# Patient Record
Sex: MALE | Race: OTHER | Employment: FULL TIME | URBAN - METROPOLITAN AREA
[De-identification: names, ages, dates, MRNs, and addresses within clinical notes are randomized per-mention and may not be internally consistent; named-entity substitution may affect disease eponyms.]

---

## 2017-01-09 DIAGNOSIS — G04.91 MYELITIS (HCC): Primary | ICD-10-CM

## 2017-01-09 DIAGNOSIS — G35 MULTIPLE SCLEROSIS (HCC): ICD-10-CM

## 2017-01-09 RX ORDER — TRAMADOL HYDROCHLORIDE 50 MG/1
50 TABLET ORAL EVERY 6 HOURS PRN
Qty: 60 TABLET | Refills: 0 | Status: SHIPPED
Start: 2017-01-09 | End: 2017-02-20

## 2017-01-09 NOTE — TELEPHONE ENCOUNTER
Prescription approved for Tramadol  and faxed to [facility] pharmacy with receipt confirmation.

## 2017-01-18 DIAGNOSIS — G35 MULTIPLE SCLEROSIS (HCC): Primary | ICD-10-CM

## 2017-01-18 RX ORDER — GLATIRAMER ACETATE 40 MG/ML
40 INJECTION, SOLUTION SUBCUTANEOUS
Qty: 12 SYRINGE | Refills: 11 | Status: SHIPPED | OUTPATIENT
Start: 2017-01-18 | End: 2018-01-10

## 2017-01-18 NOTE — TELEPHONE ENCOUNTER
Medication: Copaxone    Date of last refill: 1/11/2016  [place] medication    Last office visit: 9/27/2016  Due back to clinic per last office note:  RTC in 6 months  Date next office visit scheduled:  None s

## 2017-02-02 ENCOUNTER — TELEPHONE (OUTPATIENT)
Dept: NEUROLOGY | Facility: CLINIC | Age: 37
End: 2017-02-02

## 2017-02-02 NOTE — TELEPHONE ENCOUNTER
Patient with questions regarding his current medications and receiving a CDL for work. Patient instructed that he would need to verify with licensure/physical regarding Tramadol  He reports he uses it once a day.   Instructed he could not use Ambien before

## 2017-02-20 DIAGNOSIS — G04.91 MYELITIS (HCC): Primary | ICD-10-CM

## 2017-02-20 DIAGNOSIS — G35 MULTIPLE SCLEROSIS (HCC): ICD-10-CM

## 2017-02-20 RX ORDER — TRAMADOL HYDROCHLORIDE 50 MG/1
50 TABLET ORAL EVERY 6 HOURS PRN
Qty: 60 TABLET | Refills: 0 | Status: SHIPPED | OUTPATIENT
Start: 2017-02-20 | End: 2017-04-03

## 2017-02-20 NOTE — TELEPHONE ENCOUNTER
Medication: tramadol    Date of last refill: 1/9/17  [place] 5/5/64    Last office visit: 9/27/16  Due back to clinic per last office note:  6 months  Date next office visit scheduled:  No future appointments.     Last OV

## 2017-03-07 ENCOUNTER — OFFICE VISIT (OUTPATIENT)
Dept: FAMILY MEDICINE CLINIC | Facility: CLINIC | Age: 37
End: 2017-03-07

## 2017-03-07 VITALS
WEIGHT: 184 LBS | SYSTOLIC BLOOD PRESSURE: 108 MMHG | TEMPERATURE: 99 F | BODY MASS INDEX: 25.76 KG/M2 | HEART RATE: 78 BPM | RESPIRATION RATE: 20 BRPM | HEIGHT: 71 IN | OXYGEN SATURATION: 98 % | DIASTOLIC BLOOD PRESSURE: 68 MMHG

## 2017-03-07 DIAGNOSIS — G35 MULTIPLE SCLEROSIS (HCC): Primary | ICD-10-CM

## 2017-03-07 PROCEDURE — 99203 OFFICE O/P NEW LOW 30 MIN: CPT | Performed by: FAMILY MEDICINE

## 2017-03-08 ENCOUNTER — NURSE ONLY (OUTPATIENT)
Dept: NEUROLOGY | Facility: CLINIC | Age: 37
End: 2017-03-08

## 2017-03-08 VITALS — SYSTOLIC BLOOD PRESSURE: 110 MMHG | HEART RATE: 64 BPM | DIASTOLIC BLOOD PRESSURE: 64 MMHG | RESPIRATION RATE: 16 BRPM

## 2017-03-08 DIAGNOSIS — G35 MULTIPLE SCLEROSIS (HCC): Primary | ICD-10-CM

## 2017-03-08 PROCEDURE — 96365 THER/PROPH/DIAG IV INF INIT: CPT | Performed by: OTHER

## 2017-03-08 RX ORDER — METHYLPREDNISOLONE SODIUM SUCCINATE 500 MG/1
500 INJECTION, POWDER, FOR SOLUTION INTRAMUSCULAR; INTRAVENOUS ONCE
Status: COMPLETED | OUTPATIENT
Start: 2017-03-08 | End: 2017-03-08

## 2017-03-08 RX ADMIN — METHYLPREDNISOLONE SODIUM SUCCINATE 500 MG: 500 INJECTION, POWDER, FOR SOLUTION INTRAMUSCULAR; INTRAVENOUS at 10:37:00

## 2017-03-08 NOTE — PROGRESS NOTES
IV started per Yamila Flores RN into the R AC and Solu-Medrol infused over 20 minutes without difficulty. IV discontinued per RN. Patient tolerated infusion well with no complications.

## 2017-03-13 ENCOUNTER — TELEPHONE (OUTPATIENT)
Dept: NEUROLOGY | Facility: CLINIC | Age: 37
End: 2017-03-13

## 2017-03-13 ENCOUNTER — NURSE ONLY (OUTPATIENT)
Dept: NEUROLOGY | Facility: CLINIC | Age: 37
End: 2017-03-13

## 2017-03-13 VITALS — RESPIRATION RATE: 18 BRPM | DIASTOLIC BLOOD PRESSURE: 78 MMHG | HEART RATE: 69 BPM | SYSTOLIC BLOOD PRESSURE: 126 MMHG

## 2017-03-13 DIAGNOSIS — F19.982 INSOMNIA DUE TO DRUG (HCC): ICD-10-CM

## 2017-03-13 DIAGNOSIS — G35 MULTIPLE SCLEROSIS (HCC): Primary | ICD-10-CM

## 2017-03-13 PROCEDURE — 96365 THER/PROPH/DIAG IV INF INIT: CPT | Performed by: OTHER

## 2017-03-13 RX ORDER — ZOLPIDEM TARTRATE 10 MG/1
10 TABLET ORAL NIGHTLY PRN
Qty: 10 TABLET | Refills: 0 | Status: SHIPPED
Start: 2017-03-13 | End: 2017-05-15

## 2017-03-13 RX ORDER — METHYLPREDNISOLONE SODIUM SUCCINATE 500 MG/1
500 INJECTION, POWDER, FOR SOLUTION INTRAMUSCULAR; INTRAVENOUS ONCE
Status: COMPLETED | OUTPATIENT
Start: 2017-03-13 | End: 2017-03-13

## 2017-03-13 RX ADMIN — METHYLPREDNISOLONE SODIUM SUCCINATE 500 MG: 500 INJECTION, POWDER, FOR SOLUTION INTRAMUSCULAR; INTRAVENOUS at 14:52:00

## 2017-03-13 NOTE — PROGRESS NOTES
IV started per Alcides Castle into Right AC and Solu-Medrol infused over 20 minutes without difficulty. IV discontinued per RN. Patient tolerated infusion well with no complications.

## 2017-03-13 NOTE — TELEPHONE ENCOUNTER
Discussed with franky De La Garza to give additional 500 mg solumedrol today. Patient contacted for appointment.

## 2017-03-13 NOTE — TELEPHONE ENCOUNTER
At his solumedrol appointment today, Makenzie Leo requests a refill of his Ambien.     Medication: Ambien    Date of last refill: 9/27/16  [place] 1/01/03    Last office visit: 9/27/16 (MD appointment); 3/13/17 (Solumedrol)  Due

## 2017-03-31 ENCOUNTER — TELEPHONE (OUTPATIENT)
Dept: NEUROLOGY | Facility: CLINIC | Age: 37
End: 2017-03-31

## 2017-03-31 DIAGNOSIS — G04.91 MYELITIS (HCC): Primary | ICD-10-CM

## 2017-03-31 DIAGNOSIS — G35 MULTIPLE SCLEROSIS (HCC): ICD-10-CM

## 2017-04-03 RX ORDER — TRAMADOL HYDROCHLORIDE 50 MG/1
50 TABLET ORAL EVERY 6 HOURS PRN
Qty: 60 TABLET | Refills: 0 | Status: SHIPPED
Start: 2017-04-03 | End: 2017-05-15

## 2017-04-03 NOTE — TELEPHONE ENCOUNTER
Medication: Tramadol    Date of last refill: 2-20-17  [place] 6-89-05    Last office visit: 9-27-16  Due back to clinic per last office note:  6 mo  Date next office visit scheduled:  4-13-17    Last OV note recommendati

## 2017-04-06 RX ORDER — TRAMADOL HYDROCHLORIDE 50 MG/1
TABLET ORAL
Qty: 60 TABLET | Refills: 0 | OUTPATIENT
Start: 2017-04-06

## 2017-04-06 NOTE — PROGRESS NOTES
HPI:    Patient ID: Amina Luna is a 29year old male. HPI Comments: Pt with chronic MS.   Gets blurry vision, numbness, and weakness and dizzyness from this in the past.  Changed insurance and needs a referral to see neurologist.  Has been off of neuro as soon as possible. 1. Multiple sclerosis ([facility] [address]): supra and infratentorial  - Neuro Referral - In Network     No orders of the defined types were placed in this encounter.        Meds This Visit:  No prescriptions requested or ordered in this encount

## 2017-04-13 ENCOUNTER — OFFICE VISIT (OUTPATIENT)
Dept: NEUROLOGY | Facility: CLINIC | Age: 37
End: 2017-04-13

## 2017-04-13 VITALS
SYSTOLIC BLOOD PRESSURE: 115 MMHG | RESPIRATION RATE: 16 BRPM | WEIGHT: 184 LBS | DIASTOLIC BLOOD PRESSURE: 66 MMHG | HEIGHT: 71 IN | HEART RATE: 70 BPM | BODY MASS INDEX: 25.76 KG/M2

## 2017-04-13 DIAGNOSIS — G35 MULTIPLE SCLEROSIS (HCC): Primary | ICD-10-CM

## 2017-04-13 DIAGNOSIS — G04.91 MYELITIS (HCC): ICD-10-CM

## 2017-04-13 PROCEDURE — 99214 OFFICE O/P EST MOD 30 MIN: CPT | Performed by: OTHER

## 2017-04-13 PROCEDURE — 96365 THER/PROPH/DIAG IV INF INIT: CPT | Performed by: OTHER

## 2017-04-13 RX ORDER — METHYLPREDNISOLONE SODIUM SUCCINATE 500 MG/1
500 INJECTION, POWDER, FOR SOLUTION INTRAMUSCULAR; INTRAVENOUS DAILY
Status: COMPLETED | OUTPATIENT
Start: 2017-04-13 | End: 2017-04-14

## 2017-04-13 RX ADMIN — METHYLPREDNISOLONE SODIUM SUCCINATE 500 MG: 500 INJECTION, POWDER, FOR SOLUTION INTRAMUSCULAR; INTRAVENOUS at 11:55:00

## 2017-04-13 NOTE — PATIENT INSTRUCTIONS
Refill policies:    • Allow 2 business days for refills; controlled substances may take longer.   • Contact your pharmacy at least 5 days prior to running out of medication and have them send an electronic request or submit request through the “request re insurance carrier to obtain pre-certification or prior authorization. Unfortunately, [facility] has seen an increase in denial of payment even though the procedure/test has been pre-certified.   You are strongly encouraged to contact your insurance carrier to v

## 2017-04-13 NOTE — PROGRESS NOTES
[facility] with [facility] [place]  11/3/1980  Primary Care Provider:  Rommel Malik DO    4/13/2017    29year old yo patient being seen for:  MS    Currently on Copaxone and pulsed s infratentorial  (primary encounter diagnosis)  Myelitis:  entire cord, nodular pattern    Discussion on the case:  He is actually stable with this regimen and it is the \"Safest\" to use  Other alternative are all risky  No signs of it breaking through by

## 2017-04-13 NOTE — PROGRESS NOTES
IV started per Dallas Botello RN into Right AC and Solu-Medrol infused over 20 minutes without difficulty. IV discontinued per RN. Patient tolerated infusion well with no complications.

## 2017-04-14 ENCOUNTER — NURSE ONLY (OUTPATIENT)
Dept: NEUROLOGY | Facility: CLINIC | Age: 37
End: 2017-04-14

## 2017-04-14 DIAGNOSIS — G35 MULTIPLE SCLEROSIS (HCC): Primary | ICD-10-CM

## 2017-04-14 PROCEDURE — 96365 THER/PROPH/DIAG IV INF INIT: CPT | Performed by: OTHER

## 2017-04-14 RX ORDER — METHYLPREDNISOLONE 4 MG/1
TABLET ORAL
Qty: 1 KIT | Refills: 0 | Status: SHIPPED | OUTPATIENT
Start: 2017-04-14 | End: 2017-04-22

## 2017-04-14 RX ADMIN — METHYLPREDNISOLONE SODIUM SUCCINATE 500 MG: 500 INJECTION, POWDER, FOR SOLUTION INTRAMUSCULAR; INTRAVENOUS at 09:34:00

## 2017-04-14 NOTE — PROGRESS NOTES
IV started per Candis Kelly RN into R AC and Solu-Medrol infused over 20 minutes without difficulty. IV discontinued per RN. Patient tolerated infusion well with no complications.

## 2017-04-22 ENCOUNTER — OFFICE VISIT (OUTPATIENT)
Dept: FAMILY MEDICINE CLINIC | Facility: CLINIC | Age: 37
End: 2017-04-22

## 2017-04-22 ENCOUNTER — HOSPITAL ENCOUNTER (OUTPATIENT)
Dept: GENERAL RADIOLOGY | Age: 37
Discharge: HOME | End: 2017-04-22
Attending: FAMILY MEDICINE
Payer: COMMERCIAL

## 2017-04-22 VITALS
WEIGHT: 180 LBS | RESPIRATION RATE: 18 BRPM | DIASTOLIC BLOOD PRESSURE: 86 MMHG | SYSTOLIC BLOOD PRESSURE: 126 MMHG | HEIGHT: 71 IN | BODY MASS INDEX: 25.2 KG/M2 | HEART RATE: 65 BPM

## 2017-04-22 DIAGNOSIS — S89.92XA LEFT LEG INJURY, INITIAL ENCOUNTER: ICD-10-CM
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
## 2017-04-22 DIAGNOSIS — S89.92XA LEFT LEG INJURY, INITIAL ENCOUNTER: Primary | ICD-10-CM

## 2017-04-22 DIAGNOSIS — S09.90XA HEAD INJURIES, INITIAL ENCOUNTER: ICD-10-CM

## 2017-04-22 PROCEDURE — 99214 OFFICE O/P EST MOD 30 MIN: CPT | Performed by: FAMILY MEDICINE

## 2017-04-22 PROCEDURE — 73560 X-RAY EXAM OF KNEE 1 OR 2: CPT

## 2017-04-22 PROCEDURE — 73590 X-RAY EXAM OF LOWER LEG: CPT

## 2017-04-22 RX ORDER — HYDROCODONE BITARTRATE AND ACETAMINOPHEN 10; 325 MG/1; MG/1
1 TABLET ORAL EVERY 6 HOURS PRN
Qty: 20 TABLET | Refills: 0 | Status: SHIPPED | OUTPATIENT
Start: 2017-04-22 | End: 2017-05-12
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
## 2017-05-15 ENCOUNTER — NURSE ONLY (OUTPATIENT)
Dept: NEUROLOGY | Facility: CLINIC | Age: 37
End: 2017-05-15

## 2017-05-15 VITALS — SYSTOLIC BLOOD PRESSURE: 114 MMHG | HEART RATE: 74 BPM | DIASTOLIC BLOOD PRESSURE: 64 MMHG | RESPIRATION RATE: 18 BRPM

## 2017-05-15 DIAGNOSIS — G35 MULTIPLE SCLEROSIS (HCC): Primary | ICD-10-CM

## 2017-05-15 DIAGNOSIS — G35 MULTIPLE SCLEROSIS (HCC): ICD-10-CM

## 2017-05-15 DIAGNOSIS — F19.982 INSOMNIA DUE TO DRUG (HCC): ICD-10-CM

## 2017-05-15 DIAGNOSIS — G04.91 MYELITIS (HCC): Primary | ICD-10-CM

## 2017-05-15 PROCEDURE — 96365 THER/PROPH/DIAG IV INF INIT: CPT | Performed by: OTHER

## 2017-05-15 RX ORDER — TRAMADOL HYDROCHLORIDE 50 MG/1
50 TABLET ORAL EVERY 6 HOURS PRN
Qty: 60 TABLET | Refills: 0 | Status: SHIPPED | OUTPATIENT
Start: 2017-05-15 | End: 2017-06-13

## 2017-05-15 RX ORDER — TRAMADOL HYDROCHLORIDE 50 MG/1
50 TABLET ORAL EVERY 6 HOURS PRN
Qty: 60 TABLET | Refills: 0 | Status: CANCELLED | OUTPATIENT
Start: 2017-05-15

## 2017-05-15 RX ORDER — ZOLPIDEM TARTRATE 10 MG/1
10 TABLET ORAL NIGHTLY PRN
Qty: 10 TABLET | Refills: 0 | Status: SHIPPED | OUTPATIENT
Start: 2017-05-15 | End: 2017-08-16

## 2017-05-15 RX ORDER — METHYLPREDNISOLONE SODIUM SUCCINATE 500 MG/1
500 INJECTION, POWDER, FOR SOLUTION INTRAMUSCULAR; INTRAVENOUS ONCE
Status: COMPLETED | OUTPATIENT
Start: 2017-05-15 | End: 2017-05-15

## 2017-05-15 RX ORDER — ZOLPIDEM TARTRATE 10 MG/1
10 TABLET ORAL NIGHTLY PRN
Qty: 10 TABLET | Refills: 0 | Status: CANCELLED | OUTPATIENT
Start: 2017-05-15

## 2017-05-15 RX ADMIN — METHYLPREDNISOLONE SODIUM SUCCINATE 500 MG: 500 INJECTION, POWDER, FOR SOLUTION INTRAMUSCULAR; INTRAVENOUS at 12:05:00

## 2017-05-15 NOTE — PROGRESS NOTES
IV started per Alcides Castle RN into Right AC and Solu-Medrol infused over 20 minutes without difficulty. IV discontinued per RN. Patient tolerated infusion well with no complications.

## 2017-05-15 NOTE — TELEPHONE ENCOUNTER
Medication: Tramadol    Date of last refill: 4/3/2016  [place] Filled for 60 tabs/no refills    Last office visit: 4/13/2017  Due back to clinic per last office note:  RTC in 6-8 months  Date next office visit scheduled:

## 2017-05-23 ENCOUNTER — OFFICE VISIT (OUTPATIENT)
Dept: FAMILY MEDICINE CLINIC | Facility: CLINIC | Age: 37
End: 2017-05-23

## 2017-05-23 VITALS
HEART RATE: 72 BPM | TEMPERATURE: 98 F | HEIGHT: 71 IN | WEIGHT: 174 LBS | BODY MASS INDEX: 24.36 KG/M2 | SYSTOLIC BLOOD PRESSURE: 100 MMHG | RESPIRATION RATE: 18 BRPM | DIASTOLIC BLOOD PRESSURE: 70 MMHG

## 2017-05-23 DIAGNOSIS — G35 MULTIPLE SCLEROSIS (HCC): Primary | ICD-10-CM

## 2017-05-23 DIAGNOSIS — Z00.00 ANNUAL PHYSICAL EXAM: ICD-10-CM

## 2017-05-23 PROCEDURE — G0438 PPPS, INITIAL VISIT: HCPCS | Performed by: FAMILY MEDICINE

## 2017-05-23 PROCEDURE — 99395 PREV VISIT EST AGE 18-39: CPT | Performed by: FAMILY MEDICINE

## 2017-05-23 RX ORDER — BACLOFEN 10 MG/1
TABLET ORAL
Qty: 60 TABLET | Refills: 1 | Status: SHIPPED | OUTPATIENT
Start: 2017-05-23 | End: 2018-04-27

## 2017-05-24 ENCOUNTER — LAB ENCOUNTER (OUTPATIENT)
Dept: LAB | Age: 37
End: 2017-05-24
Attending: FAMILY MEDICINE
Payer: COMMERCIAL

## 2017-05-24 ENCOUNTER — TELEPHONE (OUTPATIENT)
Dept: FAMILY MEDICINE CLINIC | Facility: CLINIC | Age: 37
End: 2017-05-24

## 2017-05-24 DIAGNOSIS — Z00.00 ANNUAL PHYSICAL EXAM: ICD-10-CM

## 2017-05-24 PROCEDURE — 80053 COMPREHEN METABOLIC PANEL: CPT

## 2017-05-24 PROCEDURE — 36415 COLL VENOUS BLD VENIPUNCTURE: CPT

## 2017-05-24 PROCEDURE — 80061 LIPID PANEL: CPT

## 2017-05-24 PROCEDURE — 84443 ASSAY THYROID STIM HORMONE: CPT

## 2017-05-24 PROCEDURE — 82306 VITAMIN D 25 HYDROXY: CPT

## 2017-05-24 PROCEDURE — 85025 COMPLETE CBC W/AUTO DIFF WBC: CPT

## 2017-05-24 PROCEDURE — 82607 VITAMIN B-12: CPT

## 2017-05-24 NOTE — H&P
Shruthi Santos is a 29year old male who presents for a complete physical exam.   HPI:   Pt complains of chronic MS. Having leg pain. Needing tramadol and baclofen for this.            Current Outpatient Prescriptions:  baclofen 10 MG Oral Tab Take 1/2 or asthma    EXAM:   /70 mmHg  Pulse 72  Temp(Src) 98.1 °F (36.7 °C) (Oral)  Resp 18  Ht 71\"  Wt 174 lb  BMI 24.28 kg/m2  Body mass index is 24.28 kg/(m^2).      GENERAL: well developed, well nourished,in no apparent distress  SKIN: no rashes,no susp [E]  Standing Status: Future  Standing Expiration Date: 5/23/2018  Vitamin B12 [E]  Standing Status: Future  Standing Expiration Date: 5/23/2018

## 2017-06-06 NOTE — TELEPHONE ENCOUNTER
Received fax from Karrie Wheatley stating NO Records found. Called and informed patient.  Sent Reply to Scan

## 2017-06-13 DIAGNOSIS — G04.91 MYELITIS (HCC): Primary | ICD-10-CM

## 2017-06-13 DIAGNOSIS — G35 MULTIPLE SCLEROSIS (HCC): ICD-10-CM

## 2017-06-13 RX ORDER — TRAMADOL HYDROCHLORIDE 50 MG/1
50 TABLET ORAL EVERY 6 HOURS PRN
Qty: 60 TABLET | Refills: 0 | Status: SHIPPED
Start: 2017-06-13 | End: 2017-07-17

## 2017-06-13 NOTE — TELEPHONE ENCOUNTER
Medication: Tramadol    Date of last refill: 5/15/17  [place] 0/16/50    Last office visit: 4/13/17  Due back to clinic per last office note:  6 months  Date next office visit scheduled:  No future appointments.     Last

## 2017-06-27 ENCOUNTER — NURSE ONLY (OUTPATIENT)
Dept: NEUROLOGY | Facility: CLINIC | Age: 37
End: 2017-06-27

## 2017-06-27 VITALS — RESPIRATION RATE: 18 BRPM | HEART RATE: 76 BPM | DIASTOLIC BLOOD PRESSURE: 64 MMHG | SYSTOLIC BLOOD PRESSURE: 110 MMHG

## 2017-06-27 DIAGNOSIS — G35 MULTIPLE SCLEROSIS (HCC): Primary | ICD-10-CM

## 2017-06-27 PROCEDURE — 96365 THER/PROPH/DIAG IV INF INIT: CPT | Performed by: OTHER

## 2017-06-27 RX ORDER — METHYLPREDNISOLONE SODIUM SUCCINATE 500 MG/1
500 INJECTION, POWDER, FOR SOLUTION INTRAMUSCULAR; INTRAVENOUS ONCE
Status: COMPLETED | OUTPATIENT
Start: 2017-06-27 | End: 2017-06-27

## 2017-06-27 RX ADMIN — METHYLPREDNISOLONE SODIUM SUCCINATE 500 MG: 500 INJECTION, POWDER, FOR SOLUTION INTRAMUSCULAR; INTRAVENOUS at 13:13:00

## 2017-06-27 NOTE — PROGRESS NOTES
IV started per Mily Mendiola RN into right [facility] and Solu-Medrol infused over 20 minutes without difficulty. IV discontinued per RN. Patient tolerated infusion well with no complications.

## 2017-07-17 DIAGNOSIS — G04.91 MYELITIS (HCC): ICD-10-CM

## 2017-07-17 DIAGNOSIS — G35 MULTIPLE SCLEROSIS (HCC): ICD-10-CM

## 2017-07-17 RX ORDER — TRAMADOL HYDROCHLORIDE 50 MG/1
50 TABLET ORAL EVERY 6 HOURS PRN
Qty: 60 TABLET | Refills: 0 | Status: SHIPPED
Start: 2017-07-17 | End: 2017-08-16

## 2017-07-17 NOTE — TELEPHONE ENCOUNTER
Medication: tramadol    Date of last refill: 6/13/17  [place] 4/96/43    Last office visit: 4/13/17  Due back to clinic per last office note:  6 months  Date next office visit scheduled:  No future appointments.     Last

## 2017-08-09 ENCOUNTER — TELEPHONE (OUTPATIENT)
Dept: FAMILY MEDICINE CLINIC | Facility: CLINIC | Age: 37
End: 2017-08-09

## 2017-08-09 NOTE — TELEPHONE ENCOUNTER
PT NEEDS A NOTE STATING HE IS NOT TAKING TRAMADOL ANY MORE.     THIS IS FOR HIS DOT PX.     FILL OUT THIS FORM HE HAS LEFT.      CALL HIM WHEN READY AND FAX TO THE NUMBER HE HAS LEFT WITH THE FORM      Please see attached message    Patient was last seen by

## 2017-08-09 NOTE — TELEPHONE ENCOUNTER
PT NEEDS A NOTE STATING HE IS NOT TAKING TRAMADOL ANY MORE. THIS IS FOR HIS DOT PX. FILL OUT THIS FORM HE HAS LEFT. CALL HIM WHEN READY AND FAX TO THE NUMBER HE HAS LEFT WITH THE FORM.

## 2017-08-15 ENCOUNTER — TELEPHONE (OUTPATIENT)
Dept: NEUROLOGY | Facility: CLINIC | Age: 37
End: 2017-08-15

## 2017-08-15 ENCOUNTER — NURSE ONLY (OUTPATIENT)
Dept: NEUROLOGY | Facility: CLINIC | Age: 37
End: 2017-08-15

## 2017-08-15 VITALS — HEART RATE: 78 BPM | RESPIRATION RATE: 18 BRPM | SYSTOLIC BLOOD PRESSURE: 128 MMHG | DIASTOLIC BLOOD PRESSURE: 66 MMHG

## 2017-08-15 DIAGNOSIS — G35 MULTIPLE SCLEROSIS (HCC): Primary | ICD-10-CM

## 2017-08-15 PROCEDURE — 96365 THER/PROPH/DIAG IV INF INIT: CPT | Performed by: OTHER

## 2017-08-15 RX ORDER — METHYLPREDNISOLONE SODIUM SUCCINATE 500 MG/1
500 INJECTION, POWDER, FOR SOLUTION INTRAMUSCULAR; INTRAVENOUS ONCE
Status: COMPLETED | OUTPATIENT
Start: 2017-08-15 | End: 2017-08-15

## 2017-08-15 RX ADMIN — METHYLPREDNISOLONE SODIUM SUCCINATE 500 MG: 500 INJECTION, POWDER, FOR SOLUTION INTRAMUSCULAR; INTRAVENOUS at 14:13:00

## 2017-08-15 NOTE — TELEPHONE ENCOUNTER
Patient informed he will need to get note from Dr. Adrienne Machado. Clearance form left upfront for patient to .

## 2017-08-16 DIAGNOSIS — G35 MULTIPLE SCLEROSIS (HCC): ICD-10-CM

## 2017-08-16 DIAGNOSIS — F19.982 INSOMNIA DUE TO DRUG (HCC): ICD-10-CM

## 2017-08-16 DIAGNOSIS — G04.91 MYELITIS (HCC): ICD-10-CM

## 2017-08-16 RX ORDER — ZOLPIDEM TARTRATE 10 MG/1
10 TABLET ORAL NIGHTLY PRN
Qty: 10 TABLET | Refills: 0 | Status: SHIPPED
Start: 2017-08-16 | End: 2017-10-23

## 2017-08-16 RX ORDER — TRAMADOL HYDROCHLORIDE 50 MG/1
50 TABLET ORAL EVERY 6 HOURS PRN
Qty: 60 TABLET | Refills: 0 | Status: SHIPPED
Start: 2017-08-16 | End: 2017-09-19

## 2017-08-16 NOTE — TELEPHONE ENCOUNTER
Medication: tramadol, ambien    Date of last refill: 7/17/17 (tramadol), 5/15/17 Elizabeth Romberg)  [place] 6/71/29 (tramadol), 5/15/17 Elizabeth Romberg)    Last office visit: 4/13/17  Due back to clinic per last office note:  6 months  D

## 2017-09-19 ENCOUNTER — NURSE ONLY (OUTPATIENT)
Dept: NEUROLOGY | Facility: CLINIC | Age: 37
End: 2017-09-19

## 2017-09-19 VITALS
HEIGHT: 71 IN | RESPIRATION RATE: 16 BRPM | DIASTOLIC BLOOD PRESSURE: 77 MMHG | SYSTOLIC BLOOD PRESSURE: 136 MMHG | WEIGHT: 182 LBS | BODY MASS INDEX: 25.48 KG/M2 | HEART RATE: 77 BPM

## 2017-09-19 DIAGNOSIS — G35 MULTIPLE SCLEROSIS (HCC): ICD-10-CM

## 2017-09-19 DIAGNOSIS — G04.91 MYELITIS (HCC): ICD-10-CM

## 2017-09-19 DIAGNOSIS — G35 MULTIPLE SCLEROSIS (HCC): Primary | ICD-10-CM

## 2017-09-19 PROCEDURE — 96365 THER/PROPH/DIAG IV INF INIT: CPT | Performed by: OTHER

## 2017-09-19 RX ORDER — TRAMADOL HYDROCHLORIDE 50 MG/1
50 TABLET ORAL EVERY 6 HOURS PRN
Qty: 60 TABLET | Refills: 0 | Status: SHIPPED
Start: 2017-09-19 | End: 2017-10-19

## 2017-09-19 RX ORDER — METHYLPREDNISOLONE SODIUM SUCCINATE 500 MG/1
500 INJECTION, POWDER, FOR SOLUTION INTRAMUSCULAR; INTRAVENOUS ONCE
Status: COMPLETED | OUTPATIENT
Start: 2017-09-19 | End: 2017-09-19

## 2017-09-19 RX ADMIN — METHYLPREDNISOLONE SODIUM SUCCINATE 500 MG: 500 INJECTION, POWDER, FOR SOLUTION INTRAMUSCULAR; INTRAVENOUS at 13:35:00

## 2017-09-19 NOTE — TELEPHONE ENCOUNTER
Medication: Tramadol    Date of last refill: 8/16/2017  [place] for this medication    Last office visit: 4/13/2017  Due back to clinic per last office note:  RTC in 6 months  Date next office visit scheduled:  Future

## 2017-09-19 NOTE — PROGRESS NOTES
IV started per Diane Bose RN into right antecubital site and Solu-Medrol infused over 20 minutes without difficulty. IV discontinued per RN. Patient tolerated infusion well with no complications. Patient requesting Tramadol refill during visit.   Will proce

## 2017-09-19 NOTE — PATIENT INSTRUCTIONS
Refill policies:    • Allow 2-3 business days for refills; controlled substances may take longer.   • Contact your pharmacy at least 5 days prior to running out of medication and have them send an electronic request or submit request through the [facility] have a procedure or additional testing performed. [facility]) will contact your insurance carrier to obtain pre-certification or prior authorization.     Unfortunately, [facility] has seen an increase in denial of payment even though the p

## 2017-10-02 ENCOUNTER — TELEPHONE (OUTPATIENT)
Dept: NEUROLOGY | Facility: CLINIC | Age: 37
End: 2017-10-02

## 2017-10-19 DIAGNOSIS — G35 MULTIPLE SCLEROSIS (HCC): ICD-10-CM

## 2017-10-19 DIAGNOSIS — G04.91 MYELITIS (HCC): ICD-10-CM

## 2017-10-19 RX ORDER — TRAMADOL HYDROCHLORIDE 50 MG/1
50 TABLET ORAL EVERY 6 HOURS PRN
Qty: 60 TABLET | Refills: 0 | Status: SHIPPED
Start: 2017-10-19 | End: 2017-11-16

## 2017-10-19 NOTE — TELEPHONE ENCOUNTER
Prescription approved for Tramadol and faxed to [facility] pharmacy with receipt confirmation.

## 2017-10-19 NOTE — TELEPHONE ENCOUNTER
Medication: Tramadol 50 mg     Date of last refill: 09/19/17  [place] for this medication     Last office visit: 4/13/2017  Due back to clinic per last office note:  RTC in 6 months  Date next office visit schedule

## 2017-10-23 ENCOUNTER — OFFICE VISIT (OUTPATIENT)
Dept: NEUROLOGY | Facility: CLINIC | Age: 37
End: 2017-10-23

## 2017-10-23 VITALS
HEIGHT: 71 IN | WEIGHT: 186 LBS | SYSTOLIC BLOOD PRESSURE: 128 MMHG | BODY MASS INDEX: 26.04 KG/M2 | DIASTOLIC BLOOD PRESSURE: 63 MMHG | RESPIRATION RATE: 16 BRPM | HEART RATE: 78 BPM

## 2017-10-23 DIAGNOSIS — F19.982 INSOMNIA DUE TO DRUG (HCC): ICD-10-CM

## 2017-10-23 DIAGNOSIS — G35 MULTIPLE SCLEROSIS (HCC): Primary | ICD-10-CM

## 2017-10-23 DIAGNOSIS — R53.83 OTHER FATIGUE: ICD-10-CM

## 2017-10-23 PROCEDURE — 96365 THER/PROPH/DIAG IV INF INIT: CPT | Performed by: PHYSICIAN ASSISTANT

## 2017-10-23 PROCEDURE — 99214 OFFICE O/P EST MOD 30 MIN: CPT | Performed by: PHYSICIAN ASSISTANT

## 2017-10-23 RX ORDER — METHYLPREDNISOLONE SODIUM SUCCINATE 500 MG/1
500 INJECTION, POWDER, FOR SOLUTION INTRAMUSCULAR; INTRAVENOUS ONCE
Status: COMPLETED | OUTPATIENT
Start: 2017-10-23 | End: 2017-10-23

## 2017-10-23 RX ORDER — ZOLPIDEM TARTRATE 10 MG/1
10 TABLET ORAL NIGHTLY PRN
Qty: 10 TABLET | Refills: 0 | Status: SHIPPED | OUTPATIENT
Start: 2017-10-23 | End: 2017-12-04

## 2017-10-23 RX ORDER — MODAFINIL 200 MG/1
TABLET ORAL
Qty: 30 TABLET | Refills: 2 | Status: SHIPPED | OUTPATIENT
Start: 2017-10-23 | End: 2018-07-16

## 2017-10-23 RX ADMIN — METHYLPREDNISOLONE SODIUM SUCCINATE 500 MG: 500 INJECTION, POWDER, FOR SOLUTION INTRAMUSCULAR; INTRAVENOUS at 14:45:00

## 2017-10-23 NOTE — PROGRESS NOTES
IV started per Carie Harrison RN, into right antecubital site. Solu-Medrol infused over 20 minutes without difficulty. IV discontinued per RN. Patient tolerated infusion well with no complications.

## 2017-10-23 NOTE — PATIENT INSTRUCTIONS
Refill policies:    • Allow 2-3 business days for refills; controlled substances may take longer.   • Contact your pharmacy at least 5 days prior to running out of medication and have them send an electronic request or submit request through the [facility] have a procedure or additional testing performed. [facility]) will contact your insurance carrier to obtain pre-certification or prior authorization.     Unfortunately, [facility] has seen an increase in denial of payment even though the p

## 2017-10-23 NOTE — PROGRESS NOTES
HPI:    Patient ID: Nickolas Calle is a 29year old male. HPI     Patient is a 29year old male here for follow-up of MS. He feels stable. He is currently on the copaxone and tolerating it without side effects. No weakness. No paresthesias.  Balance i Normocephalic and atraumatic. Eyes: Conjunctivae and EOM are normal.   Cardiovascular: Normal rate, regular rhythm and normal heart sounds. No murmur heard. Pulmonary/Chest: Effort normal and breath sounds normal. No respiratory distress.  He has no w (CPT=72156)       [id number]

## 2017-11-16 DIAGNOSIS — G35 MULTIPLE SCLEROSIS (HCC): ICD-10-CM

## 2017-11-16 DIAGNOSIS — G04.91 MYELITIS (HCC): ICD-10-CM

## 2017-11-16 RX ORDER — TRAMADOL HYDROCHLORIDE 50 MG/1
50 TABLET ORAL EVERY 6 HOURS PRN
Qty: 60 TABLET | Refills: 0 | Status: SHIPPED
Start: 2017-11-16 | End: 2017-11-22

## 2017-11-16 NOTE — TELEPHONE ENCOUNTER
Prescription approved for Tramadol and faxed to [facility] pharmacy with receipt confirmation.

## 2017-11-16 NOTE — TELEPHONE ENCOUNTER
Clarified that Ej's modafinil will counteract sleepiness.     Medication: tramadol    Date of last refill: 10/19/17  [place] 03/71/77    Last office visit: 10/23/17  Due back to clinic per last office note:  4 months  D

## 2017-11-22 RX ORDER — TRAMADOL HYDROCHLORIDE 50 MG/1
50 TABLET ORAL EVERY 6 HOURS PRN
Qty: 60 TABLET | Refills: 0 | OUTPATIENT
Start: 2017-11-22 | End: 2018-01-10

## 2017-11-22 NOTE — TELEPHONE ENCOUNTER
Mail order Rx cancelled. Called to [employer] on file. Rx to [employer] for [employer].

## 2017-12-04 ENCOUNTER — NURSE ONLY (OUTPATIENT)
Dept: NEUROLOGY | Facility: CLINIC | Age: 37
End: 2017-12-04

## 2017-12-04 VITALS — RESPIRATION RATE: 18 BRPM | SYSTOLIC BLOOD PRESSURE: 120 MMHG | DIASTOLIC BLOOD PRESSURE: 74 MMHG | HEART RATE: 72 BPM

## 2017-12-04 DIAGNOSIS — F19.982 INSOMNIA DUE TO DRUG (HCC): ICD-10-CM

## 2017-12-04 DIAGNOSIS — G35 MULTIPLE SCLEROSIS (HCC): Primary | ICD-10-CM

## 2017-12-04 PROCEDURE — 96365 THER/PROPH/DIAG IV INF INIT: CPT | Performed by: OTHER

## 2017-12-04 RX ORDER — ZOLPIDEM TARTRATE 10 MG/1
10 TABLET ORAL NIGHTLY PRN
Qty: 10 TABLET | Refills: 0 | Status: SHIPPED
Start: 2017-12-04 | End: 2018-05-23

## 2017-12-04 RX ORDER — METHYLPREDNISOLONE SODIUM SUCCINATE 500 MG/1
500 INJECTION, POWDER, FOR SOLUTION INTRAMUSCULAR; INTRAVENOUS ONCE
Status: COMPLETED | OUTPATIENT
Start: 2017-12-04 | End: 2017-12-04

## 2017-12-04 RX ADMIN — METHYLPREDNISOLONE SODIUM SUCCINATE 500 MG: 500 INJECTION, POWDER, FOR SOLUTION INTRAMUSCULAR; INTRAVENOUS at 11:56:00

## 2017-12-04 NOTE — TELEPHONE ENCOUNTER
Jozef Figueroa requesting refill on Ambien.     Medication: Ana Crespo    Date of last refill: 10/23/17  [place] 87/65/00    Last office visit: 10/23/17  Due back to clinic per last office note:  4 months  Date next office visit schedu

## 2017-12-04 NOTE — PROGRESS NOTES
IV started per Mily Mendiola RN into right [facility] and Solu-Medrol infused over 20 minutes without difficulty. IV discontinued per RN. Patient tolerated infusion well with no complications.

## 2017-12-12 ENCOUNTER — TELEPHONE (OUTPATIENT)
Dept: NEUROLOGY | Facility: CLINIC | Age: 37
End: 2017-12-12

## 2018-01-09 ENCOUNTER — TELEPHONE (OUTPATIENT)
Dept: NEUROLOGY | Facility: CLINIC | Age: 38
End: 2018-01-09

## 2018-01-09 NOTE — TELEPHONE ENCOUNTER
Shanda Vanegas reports that he is in between insurance and needs assistance procuring his Copaxone until February, when his insurance kicks in. Gave telephone number for [employer] to initiate free drug assistance.

## 2018-01-10 DIAGNOSIS — G35 MULTIPLE SCLEROSIS (HCC): ICD-10-CM

## 2018-01-10 DIAGNOSIS — G04.91 MYELITIS (HCC): ICD-10-CM

## 2018-01-10 RX ORDER — GLATIRAMER ACETATE 40 MG/ML
40 INJECTION, SOLUTION SUBCUTANEOUS
Qty: 12 SYRINGE | Refills: 11 | OUTPATIENT
Start: 2018-01-10 | End: 2018-01-17

## 2018-01-10 RX ORDER — TRAMADOL HYDROCHLORIDE 50 MG/1
50 TABLET ORAL EVERY 6 HOURS PRN
Qty: 60 TABLET | Refills: 0 | Status: SHIPPED
Start: 2018-01-10 | End: 2018-01-11

## 2018-01-10 NOTE — TELEPHONE ENCOUNTER
Medication: Tramadol 50 mg     Date of last refill: 11/22/17  [place]     Last office visit: 10/23/17  Due back to clinic per last office note:  4 months  Date next office visit scheduled:  Future Appointments  Date T

## 2018-01-10 NOTE — TELEPHONE ENCOUNTER
Medication: Copaxone    Date of last refill: 1/18/17  [place]    Last office visit: 10/23/17  Due back to clinic per last office note:  4 months  Date next office visit scheduled:  Future Appointments  Date Time Provi

## 2018-01-11 DIAGNOSIS — G35 MULTIPLE SCLEROSIS (HCC): ICD-10-CM

## 2018-01-11 DIAGNOSIS — G04.91 MYELITIS (HCC): ICD-10-CM

## 2018-01-11 RX ORDER — TRAMADOL HYDROCHLORIDE 50 MG/1
50 TABLET ORAL EVERY 6 HOURS PRN
Qty: 60 TABLET | Refills: 0 | Status: SHIPPED | OUTPATIENT
Start: 2018-01-11 | End: 2018-02-07

## 2018-01-15 RX ORDER — TRAMADOL HYDROCHLORIDE 50 MG/1
TABLET ORAL
Qty: 60 TABLET | Refills: 0 | OUTPATIENT
Start: 2018-01-15

## 2018-01-16 ENCOUNTER — TELEPHONE (OUTPATIENT)
Dept: NEUROLOGY | Facility: CLINIC | Age: 38
End: 2018-01-16

## 2018-01-16 DIAGNOSIS — G35 MULTIPLE SCLEROSIS (HCC): Primary | ICD-10-CM

## 2018-01-16 DIAGNOSIS — G35 MULTIPLE SCLEROSIS (HCC): ICD-10-CM

## 2018-01-16 NOTE — TELEPHONE ENCOUNTER
Attempted to submit PA via CMM. Rec'd error message \"subscriber not found\". LMTCB to clarify ID number; per [facility] ID needs to be 11 characters long. Number provided is only 9 characters.

## 2018-01-16 NOTE — TELEPHONE ENCOUNTER
Pt has [facility] insurance for 1/1/8-1/31/18  [facility]#[id number]    Pt changing Ins to [facility] ppo for 2/1/18

## 2018-01-17 RX ORDER — GLATIRAMER ACETATE 40 MG/ML
40 INJECTION, SOLUTION SUBCUTANEOUS
Qty: 12 SYRINGE | Refills: 11 | Status: SHIPPED | OUTPATIENT
Start: 2018-01-17 | End: 2018-02-21

## 2018-01-17 NOTE — TELEPHONE ENCOUNTER
Received approval for Copaxone 40 mg effective 1/16/18 - 1/16/19. Patient informed, verbalized understanding. He requests script to go to [facility]. Script forwarded as requested.

## 2018-02-07 DIAGNOSIS — G35 MULTIPLE SCLEROSIS (HCC): ICD-10-CM

## 2018-02-07 DIAGNOSIS — G04.91 MYELITIS (HCC): ICD-10-CM

## 2018-02-07 NOTE — TELEPHONE ENCOUNTER
Medication: Tramadol     Date of last refill: 1/11/2018  [place] 7/68/6583    Last office visit: 10/23/2018  Due back to clinic per last office note:  4 months  Date next office visit scheduled:  2/19/2018    Last OV not

## 2018-02-08 RX ORDER — TRAMADOL HYDROCHLORIDE 50 MG/1
50 TABLET ORAL EVERY 6 HOURS PRN
Qty: 60 TABLET | Refills: 0 | Status: SHIPPED
Start: 2018-02-08 | End: 2018-03-21

## 2018-02-21 ENCOUNTER — TELEPHONE (OUTPATIENT)
Dept: NEUROLOGY | Facility: CLINIC | Age: 38
End: 2018-02-21

## 2018-02-21 DIAGNOSIS — G35 MULTIPLE SCLEROSIS (HCC): ICD-10-CM

## 2018-02-21 RX ORDER — GLATIRAMER ACETATE 40 MG/ML
40 INJECTION, SOLUTION SUBCUTANEOUS
Qty: 12 SYRINGE | Refills: 11 | Status: SHIPPED | OUTPATIENT
Start: 2018-02-21 | End: 2018-03-08

## 2018-02-21 NOTE — TELEPHONE ENCOUNTER
Received request for Copaxone to new specialty pharmacy [facility]. Scripts forwarded as requested.

## 2018-02-22 ENCOUNTER — TELEPHONE (OUTPATIENT)
Dept: NEUROLOGY | Facility: CLINIC | Age: 38
End: 2018-02-22

## 2018-02-22 DIAGNOSIS — G35 MULTIPLE SCLEROSIS (HCC): Primary | ICD-10-CM

## 2018-02-22 NOTE — TELEPHONE ENCOUNTER
Request for information from [employer]. Although request for medication was sent to them, they have no information on patient.     Hannah Spencer confirms he has new insurance:    [facility]    [id number]  Group [id number]    [facility] [place]    Referral placed for PA for

## 2018-02-23 ENCOUNTER — TELEPHONE (OUTPATIENT)
Dept: NEUROLOGY | Facility: CLINIC | Age: 38
End: 2018-02-23

## 2018-02-23 NOTE — TELEPHONE ENCOUNTER
Received approval for Copaxone effective 2/23/18 - 2/23/19. Case # [id number]. New start form initiated for patient to continue to receive copay assistance, endorsed to Alberto Murray for signature.

## 2018-03-08 ENCOUNTER — TELEPHONE (OUTPATIENT)
Dept: NEUROLOGY | Facility: CLINIC | Age: 38
End: 2018-03-08

## 2018-03-08 DIAGNOSIS — G35 MULTIPLE SCLEROSIS (HCC): ICD-10-CM

## 2018-03-08 RX ORDER — GLATIRAMER ACETATE 40 MG/ML
40 INJECTION, SOLUTION SUBCUTANEOUS
Qty: 12 SYRINGE | Refills: 11 | Status: SHIPPED | OUTPATIENT
Start: 2018-03-09 | End: 2019-02-01

## 2018-03-08 NOTE — TELEPHONE ENCOUNTER
Call received from [facility]. Calling to inform office that they are not the specialty pharmacy dispensing patient's Copaxone. [facility] specialty will dispense medication. Varun Ahumada will contact patient. Prescription has been sent to [facility].

## 2018-03-19 ENCOUNTER — HOSPITAL ENCOUNTER (OUTPATIENT)
Dept: MRI IMAGING | Age: 38
Discharge: HOME | End: 2018-03-19
Attending: PHYSICIAN ASSISTANT
Payer: COMMERCIAL

## 2018-03-19 DIAGNOSIS — G35 MULTIPLE SCLEROSIS (HCC): ICD-10-CM

## 2018-03-19 PROCEDURE — A9575 INJ GADOTERATE MEGLUMI 0.1ML: HCPCS | Performed by: PHYSICIAN ASSISTANT

## 2018-03-19 PROCEDURE — 70553 MRI BRAIN STEM W/O & W/DYE: CPT | Performed by: PHYSICIAN ASSISTANT

## 2018-03-20 ENCOUNTER — TELEPHONE (OUTPATIENT)
Dept: NEUROLOGY | Facility: CLINIC | Age: 38
End: 2018-03-20

## 2018-03-20 NOTE — TELEPHONE ENCOUNTER
Patient called to schedule his pulsed solumedrol infusion. Given MRI results. Stated the other MRI's will be scheduled next week.

## 2018-03-20 NOTE — TELEPHONE ENCOUNTER
Please call to inform patient that MRI Brain is stable. There were no new or enhancing lesions seen. We will await the results of the rest of his MRI's.

## 2018-03-21 ENCOUNTER — NURSE ONLY (OUTPATIENT)
Dept: NEUROLOGY | Facility: CLINIC | Age: 38
End: 2018-03-21

## 2018-03-21 ENCOUNTER — TELEPHONE (OUTPATIENT)
Dept: NEUROLOGY | Facility: CLINIC | Age: 38
End: 2018-03-21

## 2018-03-21 VITALS — DIASTOLIC BLOOD PRESSURE: 62 MMHG | HEART RATE: 84 BPM | RESPIRATION RATE: 18 BRPM | SYSTOLIC BLOOD PRESSURE: 112 MMHG

## 2018-03-21 DIAGNOSIS — G35 MULTIPLE SCLEROSIS (HCC): Primary | ICD-10-CM

## 2018-03-21 DIAGNOSIS — G04.91 MYELITIS (HCC): ICD-10-CM

## 2018-03-21 DIAGNOSIS — G35 MULTIPLE SCLEROSIS (HCC): ICD-10-CM

## 2018-03-21 PROCEDURE — 96365 THER/PROPH/DIAG IV INF INIT: CPT | Performed by: OTHER

## 2018-03-21 RX ORDER — METHYLPREDNISOLONE SODIUM SUCCINATE 500 MG/1
500 INJECTION, POWDER, FOR SOLUTION INTRAMUSCULAR; INTRAVENOUS ONCE
Status: COMPLETED | OUTPATIENT
Start: 2018-03-21 | End: 2018-03-21

## 2018-03-21 RX ADMIN — METHYLPREDNISOLONE SODIUM SUCCINATE 500 MG: 500 INJECTION, POWDER, FOR SOLUTION INTRAMUSCULAR; INTRAVENOUS at 13:17:00

## 2018-03-21 NOTE — PATIENT INSTRUCTIONS
Refill policies:    • Allow 2-3 business days for refills; controlled substances may take longer.   • Contact your pharmacy at least 5 days prior to running out of medication and have them send an electronic request or submit request through the [facility] for the entire amount billed. Precertification and Prior Authorizations  If your physician has recommended that you have a procedure or additional testing performed.   [employer] ([facility]) will contact your insurance carrier to obtain pr

## 2018-03-21 NOTE — TELEPHONE ENCOUNTER
Medication: Tramadol    Date of last refill: 2/8/2018  [place]    Last office visit: Monthly pulsed steroids, last 3/21/2018  Due back to clinic per last office note:  RTC for monthly infusions  Date next office visit

## 2018-03-21 NOTE — PROGRESS NOTES
IV started per Hector Rodriguez RN into right antecubital site and Solu-Medrol infused over 20 minutes without difficulty. IV discontinued per RN. Patient tolerated infusion well with no complications.
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
## 2018-03-22 RX ORDER — TRAMADOL HYDROCHLORIDE 50 MG/1
50 TABLET ORAL EVERY 6 HOURS PRN
Qty: 60 TABLET | Refills: 2 | Status: SHIPPED
Start: 2018-03-22 | End: 2018-04-27

## 2018-03-22 NOTE — TELEPHONE ENCOUNTER
Parking placard form completed and signed by physician. Mailed to patient in envelope provided. Copy to scanning.

## 2018-04-02 ENCOUNTER — TELEPHONE (OUTPATIENT)
Dept: NEUROLOGY | Facility: CLINIC | Age: 38
End: 2018-04-02

## 2018-04-27 DIAGNOSIS — G04.91 MYELITIS (HCC): ICD-10-CM

## 2018-04-27 DIAGNOSIS — G35 MULTIPLE SCLEROSIS (HCC): ICD-10-CM

## 2018-04-27 NOTE — TELEPHONE ENCOUNTER
Medication: Baclofen and Tramadol    Date of last refill: 5/23/2017 for baclofen, 3/22/2018 for tramadol  [place] for these medications    Last office visit: 10/23/2017  Due back to clinic per last office note:  Best Arredondo

## 2018-04-30 RX ORDER — BACLOFEN 10 MG/1
TABLET ORAL
Qty: 60 TABLET | Refills: 5 | Status: SHIPPED | OUTPATIENT
Start: 2018-04-30 | End: 2019-05-29

## 2018-04-30 RX ORDER — TRAMADOL HYDROCHLORIDE 50 MG/1
50 TABLET ORAL EVERY 6 HOURS PRN
Qty: 60 TABLET | Refills: 5 | Status: SHIPPED
Start: 2018-04-30 | End: 2018-10-29

## 2018-04-30 NOTE — TELEPHONE ENCOUNTER
Medication: Tramadol 50 mg & Baclofen 10 mg     Date of last refill: 03/22/18  With 2 addt refills & 05/23/17 with 1 addt refill  [place]     Last office visit: Monthly pulsed steroids, last 3/21/2018  Due back to cli

## 2018-05-23 ENCOUNTER — NURSE ONLY (OUTPATIENT)
Dept: NEUROLOGY | Facility: CLINIC | Age: 38
End: 2018-05-23

## 2018-05-23 VITALS — SYSTOLIC BLOOD PRESSURE: 118 MMHG | DIASTOLIC BLOOD PRESSURE: 72 MMHG | HEART RATE: 84 BPM | RESPIRATION RATE: 18 BRPM

## 2018-05-23 DIAGNOSIS — G35 MULTIPLE SCLEROSIS (HCC): Primary | ICD-10-CM

## 2018-05-23 DIAGNOSIS — F19.982 INSOMNIA DUE TO DRUG (HCC): ICD-10-CM

## 2018-05-23 PROCEDURE — 96365 THER/PROPH/DIAG IV INF INIT: CPT | Performed by: OTHER

## 2018-05-23 RX ORDER — METHYLPREDNISOLONE SODIUM SUCCINATE 500 MG/1
500 INJECTION, POWDER, FOR SOLUTION INTRAMUSCULAR; INTRAVENOUS ONCE
Status: COMPLETED | OUTPATIENT
Start: 2018-05-23 | End: 2018-05-23

## 2018-05-23 RX ADMIN — METHYLPREDNISOLONE SODIUM SUCCINATE 500 MG: 500 INJECTION, POWDER, FOR SOLUTION INTRAMUSCULAR; INTRAVENOUS at 11:19:00

## 2018-05-23 NOTE — TELEPHONE ENCOUNTER
Medication: Mayi Chopra (tramadol script sent on 4/30 has refills available)    Date of last refill: 12/4/17  [place] 28/3/90    Last office visit: 10/23/17  Due back to clinic per last office note:  4 months  Date next offic

## 2018-05-23 NOTE — PROGRESS NOTES
IV started per Liane Frank RN into right [facility] and Solu-Medrol infused over 20 minutes without difficulty. IV discontinued per RN. Patient tolerated infusion well with no complications.

## 2018-05-24 RX ORDER — ZOLPIDEM TARTRATE 10 MG/1
10 TABLET ORAL NIGHTLY PRN
Qty: 10 TABLET | Refills: 0 | Status: SHIPPED
Start: 2018-05-24 | End: 2018-10-30

## 2018-06-14 ENCOUNTER — TELEPHONE (OUTPATIENT)
Dept: NEUROLOGY | Facility: CLINIC | Age: 38
End: 2018-06-14

## 2018-06-27 ENCOUNTER — NURSE ONLY (OUTPATIENT)
Dept: NEUROLOGY | Facility: CLINIC | Age: 38
End: 2018-06-27

## 2018-06-27 VITALS — RESPIRATION RATE: 18 BRPM | DIASTOLIC BLOOD PRESSURE: 66 MMHG | HEART RATE: 72 BPM | SYSTOLIC BLOOD PRESSURE: 134 MMHG

## 2018-06-27 DIAGNOSIS — G35 MULTIPLE SCLEROSIS (HCC): Primary | ICD-10-CM

## 2018-06-27 PROCEDURE — 96365 THER/PROPH/DIAG IV INF INIT: CPT | Performed by: OTHER

## 2018-06-27 RX ORDER — METHYLPREDNISOLONE SODIUM SUCCINATE 500 MG/1
500 INJECTION, POWDER, FOR SOLUTION INTRAMUSCULAR; INTRAVENOUS ONCE
Status: COMPLETED | OUTPATIENT
Start: 2018-06-27 | End: 2018-06-27

## 2018-06-27 RX ADMIN — METHYLPREDNISOLONE SODIUM SUCCINATE 500 MG: 500 INJECTION, POWDER, FOR SOLUTION INTRAMUSCULAR; INTRAVENOUS at 13:52:00

## 2018-07-16 ENCOUNTER — OFFICE VISIT (OUTPATIENT)
Dept: NEUROLOGY | Facility: CLINIC | Age: 38
End: 2018-07-16

## 2018-07-16 VITALS
BODY MASS INDEX: 27.16 KG/M2 | RESPIRATION RATE: 16 BRPM | HEART RATE: 68 BPM | HEIGHT: 71 IN | SYSTOLIC BLOOD PRESSURE: 130 MMHG | WEIGHT: 194 LBS | DIASTOLIC BLOOD PRESSURE: 71 MMHG

## 2018-07-16 DIAGNOSIS — G35 MULTIPLE SCLEROSIS, RELAPSING-REMITTING (HCC): Primary | ICD-10-CM

## 2018-07-16 PROCEDURE — 99214 OFFICE O/P EST MOD 30 MIN: CPT | Performed by: PHYSICIAN ASSISTANT

## 2018-07-16 NOTE — PROGRESS NOTES
[facility] with [facility]  Ashley Friendly  11/3/1980  Primary Care Provider:  Pratibha Prieto    7/16/2018  Accompanied visit:  (X) No ( ) yes    40year old male patient being seen for: Multiple Subcutaneous Solution Prefilled Syringe, Inject 40 mg into the skin 3 (three) times a week., Disp: 12 Syringe, Rfl: 11  •  Multiple Vitamin (ONE-DAILY MULTI VITAMINS) Oral Tab, Take 1 tablet by mouth daily. , Disp: , Rfl:   PRN:     Allergies:  No Known All test results, follow up will be readjusted        Jaylen Walker, NICK  [facility]  7/16/2018, Time completed 1:47 PM    Decision making:  (  ) labs reviewed/ordered - 1  (  ) new diagnosis: - 1  (  ) Images & studies independently revie

## 2018-07-16 NOTE — PATIENT INSTRUCTIONS
Refill policies:    • Allow 2-3 business days for refills; controlled substances may take longer.   • Contact your pharmacy at least 5 days prior to running out of medication and have them send an electronic request or submit request through the “request re entire amount billed. Precertification and Prior Authorizations: If your physician has recommended that you have a procedure or additional testing performed.   [facility]) will contact your insurance carrier to obtain pre-certi

## 2018-08-01 ENCOUNTER — NURSE ONLY (OUTPATIENT)
Dept: NEUROLOGY | Facility: CLINIC | Age: 38
End: 2018-08-01

## 2018-08-01 VITALS — RESPIRATION RATE: 18 BRPM | DIASTOLIC BLOOD PRESSURE: 60 MMHG | HEART RATE: 84 BPM | SYSTOLIC BLOOD PRESSURE: 120 MMHG

## 2018-08-01 DIAGNOSIS — G35 MULTIPLE SCLEROSIS (HCC): Primary | ICD-10-CM

## 2018-08-01 PROCEDURE — 96365 THER/PROPH/DIAG IV INF INIT: CPT | Performed by: OTHER

## 2018-08-01 RX ORDER — METHYLPREDNISOLONE SODIUM SUCCINATE 500 MG/1
500 INJECTION, POWDER, FOR SOLUTION INTRAMUSCULAR; INTRAVENOUS ONCE
Status: COMPLETED | OUTPATIENT
Start: 2018-08-01 | End: 2018-08-01

## 2018-08-01 RX ADMIN — METHYLPREDNISOLONE SODIUM SUCCINATE 500 MG: 500 INJECTION, POWDER, FOR SOLUTION INTRAMUSCULAR; INTRAVENOUS at 11:09:00

## 2018-08-01 NOTE — PROGRESS NOTES
IV started per Mary Carreon RN into right [facility] and Solu-Medrol infused over 20 minutes without difficulty. IV discontinued per RN. Patient tolerated infusion well with no complications.

## 2018-08-02 ENCOUNTER — TELEPHONE (OUTPATIENT)
Dept: NEUROLOGY | Facility: CLINIC | Age: 38
End: 2018-08-02

## 2018-08-02 NOTE — TELEPHONE ENCOUNTER
At solumedrol patient indicated he needs a refill of tramadol. Refills appear to be remaining; confirmed with pharmacy that refills remain and he recently filled script. No further action needed.

## 2018-08-28 ENCOUNTER — NURSE ONLY (OUTPATIENT)
Dept: NEUROLOGY | Facility: CLINIC | Age: 38
End: 2018-08-28

## 2018-08-28 VITALS — RESPIRATION RATE: 18 BRPM | DIASTOLIC BLOOD PRESSURE: 58 MMHG | HEART RATE: 74 BPM | SYSTOLIC BLOOD PRESSURE: 114 MMHG

## 2018-08-28 DIAGNOSIS — G35 MULTIPLE SCLEROSIS (HCC): Primary | ICD-10-CM

## 2018-08-28 PROCEDURE — 96365 THER/PROPH/DIAG IV INF INIT: CPT | Performed by: OTHER

## 2018-08-28 RX ORDER — METHYLPREDNISOLONE SODIUM SUCCINATE 500 MG/1
500 INJECTION, POWDER, FOR SOLUTION INTRAMUSCULAR; INTRAVENOUS ONCE
Status: COMPLETED | OUTPATIENT
Start: 2018-08-28 | End: 2018-08-28

## 2018-08-28 RX ADMIN — METHYLPREDNISOLONE SODIUM SUCCINATE 500 MG: 500 INJECTION, POWDER, FOR SOLUTION INTRAMUSCULAR; INTRAVENOUS at 11:05:00

## 2018-08-28 NOTE — PROGRESS NOTES
IV started per Hermilo Vanegas RN into right [facility] and Solu-Medrol infused over 20 minutes without difficulty. IV discontinued per RN. Patient tolerated infusion well with no complications.

## 2018-10-01 ENCOUNTER — NURSE ONLY (OUTPATIENT)
Dept: NEUROLOGY | Facility: CLINIC | Age: 38
End: 2018-10-01
Payer: COMMERCIAL

## 2018-10-01 VITALS — RESPIRATION RATE: 16 BRPM | SYSTOLIC BLOOD PRESSURE: 120 MMHG | HEART RATE: 76 BPM | DIASTOLIC BLOOD PRESSURE: 60 MMHG

## 2018-10-01 DIAGNOSIS — G35 MULTIPLE SCLEROSIS (HCC): Primary | ICD-10-CM

## 2018-10-01 PROCEDURE — 96365 THER/PROPH/DIAG IV INF INIT: CPT | Performed by: OTHER

## 2018-10-01 RX ORDER — METHYLPREDNISOLONE SODIUM SUCCINATE 500 MG/1
500 INJECTION, POWDER, FOR SOLUTION INTRAMUSCULAR; INTRAVENOUS ONCE
Status: COMPLETED | OUTPATIENT
Start: 2018-10-01 | End: 2018-10-01

## 2018-10-01 RX ADMIN — METHYLPREDNISOLONE SODIUM SUCCINATE 500 MG: 500 INJECTION, POWDER, FOR SOLUTION INTRAMUSCULAR; INTRAVENOUS at 11:28:00

## 2018-10-01 NOTE — PROGRESS NOTES
IV started per Malachi Kelly RN into right antecubital site and Solu-Medrol infused over 20 minutes without difficulty. IV discontinued per RN. Patient tolerated infusion well with no complications.

## 2018-10-01 NOTE — PATIENT INSTRUCTIONS
Refill policies:    • Allow 2-3 business days for refills; controlled substances may take longer.   • Contact your pharmacy at least 5 days prior to running out of medication and have them send an electronic request or submit request through the “request re entire amount billed. Precertification and Prior Authorizations: If your physician has recommended that you have a procedure or additional testing performed.   [facility]) will contact your insurance carrier to obtain pre-certi

## 2018-10-29 DIAGNOSIS — G04.91 MYELITIS (HCC): ICD-10-CM

## 2018-10-29 DIAGNOSIS — G35 MULTIPLE SCLEROSIS (HCC): ICD-10-CM

## 2018-10-29 DIAGNOSIS — F19.982 INSOMNIA DUE TO DRUG (HCC): ICD-10-CM

## 2018-10-30 ENCOUNTER — NURSE ONLY (OUTPATIENT)
Dept: NEUROLOGY | Facility: CLINIC | Age: 38
End: 2018-10-30
Payer: COMMERCIAL

## 2018-10-30 VITALS — DIASTOLIC BLOOD PRESSURE: 68 MMHG | HEART RATE: 68 BPM | SYSTOLIC BLOOD PRESSURE: 114 MMHG | RESPIRATION RATE: 18 BRPM

## 2018-10-30 DIAGNOSIS — G35 MULTIPLE SCLEROSIS (HCC): Primary | ICD-10-CM

## 2018-10-30 PROCEDURE — 96365 THER/PROPH/DIAG IV INF INIT: CPT | Performed by: PHYSICIAN ASSISTANT

## 2018-10-30 RX ORDER — METHYLPREDNISOLONE SODIUM SUCCINATE 500 MG/1
500 INJECTION, POWDER, FOR SOLUTION INTRAMUSCULAR; INTRAVENOUS ONCE
Status: COMPLETED | OUTPATIENT
Start: 2018-10-30 | End: 2018-10-30

## 2018-10-30 RX ORDER — TRAMADOL HYDROCHLORIDE 50 MG/1
TABLET ORAL
Qty: 60 TABLET | Refills: 5 | Status: SHIPPED
Start: 2018-10-30 | End: 2019-02-13

## 2018-10-30 RX ORDER — ZOLPIDEM TARTRATE 10 MG/1
10 TABLET ORAL NIGHTLY PRN
Qty: 10 TABLET | Refills: 0 | Status: SHIPPED
Start: 2018-10-30 | End: 2019-03-13

## 2018-10-30 RX ADMIN — METHYLPREDNISOLONE SODIUM SUCCINATE 500 MG: 500 INJECTION, POWDER, FOR SOLUTION INTRAMUSCULAR; INTRAVENOUS at 10:13:00

## 2018-10-30 NOTE — PROGRESS NOTES
IV started per Getachew Leal RN into right [facility] and Solu-Medrol infused over 20 minutes without difficulty. IV discontinued per RN. Patient tolerated infusion well with no complications.

## 2018-10-30 NOTE — TELEPHONE ENCOUNTER
Medication:Tramadol 50 mg    Date of last refill:04/30/18 with 5 addt refills  [place]     Last office visit: 07/16/18 & 10/30/18 ( Solumedrol)  Due back to clinic per last office note:  RTN in 6 months  Date next office

## 2018-10-30 NOTE — TELEPHONE ENCOUNTER
Refill also requested for Ambien. [place] and filled script on 5/24 for #10.

## 2018-12-05 ENCOUNTER — NURSE ONLY (OUTPATIENT)
Dept: NEUROLOGY | Facility: CLINIC | Age: 38
End: 2018-12-05
Payer: COMMERCIAL

## 2018-12-05 VITALS — HEART RATE: 84 BPM | SYSTOLIC BLOOD PRESSURE: 124 MMHG | DIASTOLIC BLOOD PRESSURE: 78 MMHG | RESPIRATION RATE: 18 BRPM

## 2018-12-05 DIAGNOSIS — G35 MULTIPLE SCLEROSIS (HCC): Primary | ICD-10-CM

## 2018-12-05 PROCEDURE — 96365 THER/PROPH/DIAG IV INF INIT: CPT | Performed by: OTHER

## 2018-12-05 RX ORDER — METHYLPREDNISOLONE SODIUM SUCCINATE 500 MG/1
500 INJECTION, POWDER, FOR SOLUTION INTRAMUSCULAR; INTRAVENOUS ONCE
Status: COMPLETED | OUTPATIENT
Start: 2018-12-05 | End: 2018-12-05

## 2018-12-05 RX ADMIN — METHYLPREDNISOLONE SODIUM SUCCINATE 500 MG: 500 INJECTION, POWDER, FOR SOLUTION INTRAMUSCULAR; INTRAVENOUS at 13:23:00

## 2018-12-05 NOTE — PROGRESS NOTES
IV started per Guerita Cee RN into right [facility] and Solu-Medrol infused over 20 minutes without difficulty. IV discontinued per RN. Patient tolerated infusion well with no complications.

## 2018-12-05 NOTE — TELEPHONE ENCOUNTER
Jadyn Gray requesting valium in order to complete his MRI studies (ordered 3/20/18). Medication pended for MD approval.    PA notified that patient has intent to complete MRI studies soon.

## 2018-12-06 ENCOUNTER — TELEPHONE (OUTPATIENT)
Dept: NEUROLOGY | Facility: CLINIC | Age: 38
End: 2018-12-06

## 2018-12-06 NOTE — TELEPHONE ENCOUNTER
Prescription approved for Diazepam and faxed to [facility] pharmacy with receipt confirmation.

## 2018-12-06 NOTE — TELEPHONE ENCOUNTER
Spoke with Rico Vela at [facility]   Reference #:[id number]   Time:30:43    Per Rico Vela No Prior Authorization/Nor RQI is required for Thoracic([id number])&Cervical(17394)MRI's.

## 2019-01-04 ENCOUNTER — TELEPHONE (OUTPATIENT)
Dept: NEUROLOGY | Facility: CLINIC | Age: 39
End: 2019-01-04

## 2019-01-04 NOTE — TELEPHONE ENCOUNTER
PA initiated for continuation of Copaxone 40 mg. PA initiated via CMM and sent to plan, pending.  Key: [place]

## 2019-01-07 NOTE — TELEPHONE ENCOUNTER
Received PA approval effective 2/24/19 - 2/24/2020. Case ID [id number]. Prior PA notification was given until 2/23/19.

## 2019-01-09 ENCOUNTER — NURSE ONLY (OUTPATIENT)
Dept: NEUROLOGY | Facility: CLINIC | Age: 39
End: 2019-01-09
Payer: COMMERCIAL

## 2019-01-09 VITALS — DIASTOLIC BLOOD PRESSURE: 55 MMHG | RESPIRATION RATE: 12 BRPM | HEART RATE: 80 BPM | SYSTOLIC BLOOD PRESSURE: 123 MMHG

## 2019-01-09 DIAGNOSIS — G35 MULTIPLE SCLEROSIS (HCC): Primary | ICD-10-CM

## 2019-01-09 PROCEDURE — 96365 THER/PROPH/DIAG IV INF INIT: CPT | Performed by: PHYSICIAN ASSISTANT

## 2019-01-09 RX ORDER — METHYLPREDNISOLONE SODIUM SUCCINATE 500 MG/1
500 INJECTION, POWDER, FOR SOLUTION INTRAMUSCULAR; INTRAVENOUS ONCE
Status: COMPLETED | OUTPATIENT
Start: 2019-01-09 | End: 2019-01-09

## 2019-01-09 RX ADMIN — METHYLPREDNISOLONE SODIUM SUCCINATE 500 MG: 500 INJECTION, POWDER, FOR SOLUTION INTRAMUSCULAR; INTRAVENOUS at 13:41:00

## 2019-01-09 NOTE — PROGRESS NOTES
IV started per Mayelin Huizar RN into right [facility] and Solu-Medrol infused over 20 minutes without difficulty. IV discontinued per RN. Patient tolerated infusion well with no complications.

## 2019-01-20 ENCOUNTER — HOSPITAL ENCOUNTER (OUTPATIENT)
Facility: HOSPITAL | Age: 39
Setting detail: OBSERVATION
Discharge: HOME | End: 2019-01-22
Attending: EMERGENCY MEDICINE | Admitting: HOSPITALIST
Payer: COMMERCIAL

## 2019-01-20 DIAGNOSIS — R11.2 NAUSEA VOMITING AND DIARRHEA: Primary | ICD-10-CM

## 2019-01-20 DIAGNOSIS — R29.898 WEAKNESS OF BOTH LOWER EXTREMITIES: ICD-10-CM

## 2019-01-20 DIAGNOSIS — R19.7 NAUSEA VOMITING AND DIARRHEA: Primary | ICD-10-CM

## 2019-01-20 LAB
ALBUMIN SERPL-MCNC: 3.6 G/DL (ref 3.1–4.5)
ALBUMIN/GLOB SERPL: 1 {RATIO} (ref 1–2)
ALP LIVER SERPL-CCNC: 85 U/L (ref 45–117)
ALT SERPL-CCNC: 37 U/L (ref 17–63)
ANION GAP SERPL CALC-SCNC: 7 MMOL/L (ref 0–18)
AST SERPL-CCNC: 51 U/L (ref 15–41)
BASOPHILS # BLD AUTO: 0.02 X10(3) UL (ref 0–0.1)
BASOPHILS NFR BLD AUTO: 0.2 %
BILIRUB SERPL-MCNC: 0.4 MG/DL (ref 0.1–2)
BILIRUB UR QL STRIP.AUTO: NEGATIVE
BUN BLD-MCNC: 13 MG/DL (ref 8–20)
BUN/CREAT SERPL: 14.1 (ref 10–20)
CALCIUM BLD-MCNC: 8.2 MG/DL (ref 8.3–10.3)
CHLORIDE SERPL-SCNC: 110 MMOL/L (ref 101–111)
CLARITY UR REFRACT.AUTO: CLEAR
CO2 SERPL-SCNC: 23 MMOL/L (ref 22–32)
COLOR UR AUTO: YELLOW
CREAT BLD-MCNC: 0.92 MG/DL (ref 0.7–1.3)
EOSINOPHIL # BLD AUTO: 0.04 X10(3) UL (ref 0–0.3)
EOSINOPHIL NFR BLD AUTO: 0.3 %
ERYTHROCYTE [DISTWIDTH] IN BLOOD BY AUTOMATED COUNT: 12.2 % (ref 11.5–16)
GLOBULIN PLAS-MCNC: 3.5 G/DL (ref 2.8–4.4)
GLUCOSE BLD-MCNC: 118 MG/DL (ref 70–99)
GLUCOSE UR STRIP.AUTO-MCNC: NEGATIVE MG/DL
HCT VFR BLD AUTO: 39 % (ref 37–53)
HGB BLD-MCNC: 13.2 G/DL (ref 13–17)
IMMATURE GRANULOCYTE COUNT: 0.04 X10(3) UL (ref 0–1)
IMMATURE GRANULOCYTE RATIO %: 0.3 %
LEUKOCYTE ESTERASE UR QL STRIP.AUTO: NEGATIVE
LIPASE: 83 U/L (ref 73–393)
LYMPHOCYTES # BLD AUTO: 0.39 X10(3) UL (ref 0.9–4)
LYMPHOCYTES NFR BLD AUTO: 3.2 %
M PROTEIN MFR SERPL ELPH: 7.1 G/DL (ref 6.4–8.2)
MCH RBC QN AUTO: 29.3 PG (ref 27–33.2)
MCHC RBC AUTO-ENTMCNC: 33.8 G/DL (ref 31–37)
MCV RBC AUTO: 86.7 FL (ref 80–99)
MONOCYTES # BLD AUTO: 0.05 X10(3) UL (ref 0.1–1)
MONOCYTES NFR BLD AUTO: 0.4 %
NEUTROPHIL ABS PRELIM: 11.67 X10 (3) UL (ref 1.3–6.7)
NEUTROPHILS # BLD AUTO: 11.67 X10(3) UL (ref 1.3–6.7)
NEUTROPHILS NFR BLD AUTO: 95.6 %
NITRITE UR QL STRIP.AUTO: NEGATIVE
OSMOLALITY SERPL CALC.SUM OF ELEC: 291 MOSM/KG (ref 275–295)
PH UR STRIP.AUTO: 6 [PH] (ref 4.5–8)
PLATELET # BLD AUTO: 226 10(3)UL (ref 150–450)
POTASSIUM SERPL-SCNC: 3.3 MMOL/L (ref 3.6–5.1)
PROT UR STRIP.AUTO-MCNC: NEGATIVE MG/DL
RBC # BLD AUTO: 4.5 X10(6)UL (ref 4.3–5.7)
RBC #/AREA URNS AUTO: >10 /HPF
RED CELL DISTRIBUTION WIDTH-SD: 38.6 FL (ref 35.1–46.3)
SODIUM SERPL-SCNC: 140 MMOL/L (ref 136–144)
SP GR UR STRIP.AUTO: 1.01 (ref 1–1.03)
UROBILINOGEN UR STRIP.AUTO-MCNC: <2 MG/DL
WBC # BLD AUTO: 12.2 X10(3) UL (ref 4–13)

## 2019-01-20 RX ORDER — ONDANSETRON 2 MG/ML
4 INJECTION INTRAMUSCULAR; INTRAVENOUS ONCE
Status: COMPLETED | OUTPATIENT
Start: 2019-01-20 | End: 2019-01-20

## 2019-01-20 RX ORDER — KETOROLAC TROMETHAMINE 30 MG/ML
30 INJECTION, SOLUTION INTRAMUSCULAR; INTRAVENOUS ONCE
Status: COMPLETED | OUTPATIENT
Start: 2019-01-20 | End: 2019-01-20

## 2019-01-21 ENCOUNTER — APPOINTMENT (OUTPATIENT)
Dept: GENERAL RADIOLOGY | Facility: HOSPITAL | Age: 39
End: 2019-01-21
Attending: HOSPITALIST
Payer: COMMERCIAL

## 2019-01-21 PROBLEM — R29.898 WEAKNESS OF BOTH LOWER EXTREMITIES: Status: ACTIVE | Noted: 2019-01-21

## 2019-01-21 PROBLEM — R11.2 NAUSEA VOMITING AND DIARRHEA: Status: ACTIVE | Noted: 2019-01-21

## 2019-01-21 PROBLEM — R19.7 NAUSEA VOMITING AND DIARRHEA: Status: ACTIVE | Noted: 2019-01-21

## 2019-01-21 LAB
ADENOVIRUS PCR:: NEGATIVE
ANION GAP SERPL CALC-SCNC: 7 MMOL/L (ref 0–18)
B PERT DNA SPEC QL NAA+PROBE: NEGATIVE
BUN BLD-MCNC: 12 MG/DL (ref 8–20)
BUN/CREAT SERPL: 15.2 (ref 10–20)
C PNEUM DNA SPEC QL NAA+PROBE: NEGATIVE
CALCIUM BLD-MCNC: 7.5 MG/DL (ref 8.3–10.3)
CHLORIDE SERPL-SCNC: 113 MMOL/L (ref 101–111)
CO2 SERPL-SCNC: 23 MMOL/L (ref 22–32)
CORONAVIRUS 229E PCR:: NEGATIVE
CORONAVIRUS HKU1 PCR:: NEGATIVE
CORONAVIRUS NL63 PCR:: NEGATIVE
CORONAVIRUS OC43 PCR:: NEGATIVE
CREAT BLD-MCNC: 0.79 MG/DL (ref 0.7–1.3)
FLUAV RNA SPEC QL NAA+PROBE: NEGATIVE
FLUBV RNA SPEC QL NAA+PROBE: NEGATIVE
GLUCOSE BLD-MCNC: 103 MG/DL (ref 70–99)
HAV IGM SER QL: 1.5 MG/DL (ref 1.8–2.5)
METAPNEUMOVIRUS PCR:: NEGATIVE
MYCOPLASMA PNEUMONIA PCR:: NEGATIVE
OSMOLALITY SERPL CALC.SUM OF ELEC: 296 MOSM/KG (ref 275–295)
PARAINFLUENZA 1 PCR:: NEGATIVE
PARAINFLUENZA 2 PCR:: NEGATIVE
PARAINFLUENZA 3 PCR:: NEGATIVE
PARAINFLUENZA 4 PCR:: NEGATIVE
POTASSIUM SERPL-SCNC: 3.4 MMOL/L (ref 3.6–5.1)
RHINOVIRUS/ENTERO PCR:: NEGATIVE
RSV RNA SPEC QL NAA+PROBE: NEGATIVE
SODIUM SERPL-SCNC: 143 MMOL/L (ref 136–144)

## 2019-01-21 PROCEDURE — 99245 OFF/OP CONSLTJ NEW/EST HI 55: CPT | Performed by: OTHER

## 2019-01-21 PROCEDURE — 71045 X-RAY EXAM CHEST 1 VIEW: CPT | Performed by: HOSPITALIST

## 2019-01-21 PROCEDURE — 99220 INITIAL OBSERVATION CARE,LEVL III: CPT | Performed by: HOSPITALIST

## 2019-01-21 RX ORDER — ONDANSETRON 2 MG/ML
4 INJECTION INTRAMUSCULAR; INTRAVENOUS EVERY 6 HOURS PRN
Status: DISCONTINUED | OUTPATIENT
Start: 2019-01-21 | End: 2019-01-22

## 2019-01-21 RX ORDER — GLATIRAMER 40 MG/ML
40 INJECTION, SOLUTION SUBCUTANEOUS
Status: DISCONTINUED | OUTPATIENT
Start: 2019-01-21 | End: 2019-01-22

## 2019-01-21 RX ORDER — TRAMADOL HYDROCHLORIDE 50 MG/1
50 TABLET ORAL EVERY 6 HOURS PRN
Status: DISCONTINUED | OUTPATIENT
Start: 2019-01-21 | End: 2019-01-22

## 2019-01-21 RX ORDER — BACLOFEN 10 MG/1
10 TABLET ORAL 3 TIMES DAILY
Status: DISCONTINUED | OUTPATIENT
Start: 2019-01-21 | End: 2019-01-22

## 2019-01-21 RX ORDER — SODIUM CHLORIDE 9 MG/ML
INJECTION, SOLUTION INTRAVENOUS CONTINUOUS
Status: DISCONTINUED | OUTPATIENT
Start: 2019-01-21 | End: 2019-01-22

## 2019-01-21 RX ORDER — HEPARIN SODIUM 5000 [USP'U]/ML
5000 INJECTION, SOLUTION INTRAVENOUS; SUBCUTANEOUS EVERY 8 HOURS SCHEDULED
Status: DISCONTINUED | OUTPATIENT
Start: 2019-01-21 | End: 2019-01-22

## 2019-01-21 RX ORDER — MORPHINE SULFATE 4 MG/ML
4 INJECTION, SOLUTION INTRAMUSCULAR; INTRAVENOUS EVERY 2 HOUR PRN
Status: DISCONTINUED | OUTPATIENT
Start: 2019-01-21 | End: 2019-01-21

## 2019-01-21 RX ORDER — ONDANSETRON 2 MG/ML
4 INJECTION INTRAMUSCULAR; INTRAVENOUS EVERY 4 HOURS PRN
Status: DISCONTINUED | OUTPATIENT
Start: 2019-01-21 | End: 2019-01-21

## 2019-01-21 RX ORDER — KETOROLAC TROMETHAMINE 30 MG/ML
15 INJECTION, SOLUTION INTRAMUSCULAR; INTRAVENOUS EVERY 6 HOURS PRN
Status: DISCONTINUED | OUTPATIENT
Start: 2019-01-21 | End: 2019-01-22

## 2019-01-21 RX ORDER — ZOLPIDEM TARTRATE 10 MG/1
10 TABLET ORAL NIGHTLY PRN
Status: DISCONTINUED | OUTPATIENT
Start: 2019-01-21 | End: 2019-01-21

## 2019-01-21 RX ORDER — MORPHINE SULFATE 4 MG/ML
1 INJECTION, SOLUTION INTRAMUSCULAR; INTRAVENOUS EVERY 2 HOUR PRN
Status: DISCONTINUED | OUTPATIENT
Start: 2019-01-21 | End: 2019-01-21

## 2019-01-21 RX ORDER — MORPHINE SULFATE 4 MG/ML
2 INJECTION, SOLUTION INTRAMUSCULAR; INTRAVENOUS EVERY 2 HOUR PRN
Status: DISCONTINUED | OUTPATIENT
Start: 2019-01-21 | End: 2019-01-21

## 2019-01-21 RX ORDER — KETOROLAC TROMETHAMINE 30 MG/ML
30 INJECTION, SOLUTION INTRAMUSCULAR; INTRAVENOUS EVERY 6 HOURS PRN
Status: DISCONTINUED | OUTPATIENT
Start: 2019-01-21 | End: 2019-01-22

## 2019-01-21 RX ORDER — MAGNESIUM OXIDE 400 MG (241.3 MG MAGNESIUM) TABLET
800 TABLET ONCE
Status: COMPLETED | OUTPATIENT
Start: 2019-01-21 | End: 2019-01-21

## 2019-01-21 RX ORDER — POTASSIUM CHLORIDE 20 MEQ/1
40 TABLET, EXTENDED RELEASE ORAL EVERY 4 HOURS
Status: COMPLETED | OUTPATIENT
Start: 2019-01-21 | End: 2019-01-21

## 2019-01-21 RX ORDER — METOCLOPRAMIDE HYDROCHLORIDE 5 MG/ML
10 INJECTION INTRAMUSCULAR; INTRAVENOUS EVERY 8 HOURS PRN
Status: DISCONTINUED | OUTPATIENT
Start: 2019-01-21 | End: 2019-01-22

## 2019-01-21 NOTE — ED PROVIDER NOTES
Patient Seen in: [facility] Emergency Department    History   Patient presents with:  Dehydration (metabolic/constitutional)  Nausea/Vomiting/Diarrhea (gastrointestinal)    Stated Complaint: hx of ms   n/v/d today  now too weak to walk    HPI    37 person, place, and time. He appears well-developed and well-nourished. HENT:   Head: Normocephalic and atraumatic. Eyes: EOM are normal. Pupils are equal, round, and reactive to light. Neck: Normal range of motion. Neck supple.    Cardiovascular: Norm Lymphocyte Absolute 0.39 (*)     Monocyte Absolute 0.05 (*)     All other components within normal limits   LIPASE - Normal   CBC WITH DIFFERENTIAL WITH PLATELET    Narrative:      The following orders were created for panel order CBC WITH DIFFERENTIAL WITH improving. Attempted to call patient's neurologist to inform him of the improvement of his lower extremity weakness and to discuss whether or not to continue giving him IV Solu-Medrol as discussed earlier however did not obtain a call back.   IV Solu-Medro

## 2019-01-21 NOTE — PLAN OF CARE
Pt has had no nausea and vomiting today. Pt tolerated gen diet. Pt has had one dose of toradol for pain with good relief. (0) independent

## 2019-01-21 NOTE — H&P
[facility] HOSPITALIST  History and Physical     Chante Hubbard Patient Status:  Emergency    11/3/1980 MRN [id number]   Location [facility] [facility] Attending Maribeth Vineland, [address] [place] [address] Day # 0 PCP TRIP PULLIAM     Chief Complaint: into the skin 3 (three) times a week. Disp: 12 Syringe Rfl: 11   Multiple Vitamin (ONE-DAILY MULTI VITAMINS) Oral Tab Take 1 tablet by mouth daily. Disp:  Rfl:        Review of Systems:   A comprehensive 14 point review of systems was completed.     Pertine fluids  3.   Possible MS exacerbation we will start Solu-Medrol IV    Quality:  · DVT Prophylaxis: scd  · CODE status: full  · Lamas: no    Plan of care discussed with patient and ED physician    Caitlyn Vidal MD  1/21/2019

## 2019-01-21 NOTE — CONSULTS
[address]  Neurology Consultation    Date of consult: 1/21/2019    Reason for consult: weakness    HPI: Damien Bosworth is a 41year old male with history of multiple sclerosis under Dr. Lamberto Bell care presents to emergency department compla Alcohol/week: 0.0 oz    Family History   Problem Relation Age of Onset   • High Cholesterol Father       Physical Examination:  /62 (BP Location: Right arm)   Pulse 92   Temp 98.1 °F (36.7 °C) (Oral)   Resp 16   Ht 71\"   Wt 191 lb 2.2 oz   SpO2 98% gastroenteritis    Recommendations:  Would not start solumedrol at this time  Medical management  hydration  DVT prophylaxis  Cont glatiramer  PT/OT  I will f/u and further recommendations to follow.    I explained to pt at length re: assessment and managem

## 2019-01-21 NOTE — PROGRESS NOTES
Reports multiple episodes of diarrhea last 24 hours, per protocol ,  initiated isolation precautions, patient aware of need for stool for CDiff. allyssa apple juice. advised to call for help to bathroom.

## 2019-01-21 NOTE — PROGRESS NOTES
[facility] HOSPITALIST  Progress Note     Frannie Awad Patient Status:  Observation    11/3/1980 MRN [id number]   [facility] 4NW-A Attending Shayy Alex MD   Hosp Day # 0 PCP TRIP PULLIAM     Chief Complaint: Viral gastro, RLE weakness contact, suspect viral gastroenteritis, improving  1. IVF  2. Advance diet  3. Antiemetics as needed  4. Stop opiates, add Toradol as needed  5. Supportive measures  2. Fever - UA & CXR negative  1. Await RVP  2. Droplet isolation  3. Dehydration  1.  IVF

## 2019-01-21 NOTE — ED INITIAL ASSESSMENT (HPI)
Patient arrives with nausea and vomiting and weakness for last day or so, hx of ms, too weak to walk.

## 2019-01-22 VITALS
HEART RATE: 83 BPM | HEIGHT: 71 IN | TEMPERATURE: 98 F | SYSTOLIC BLOOD PRESSURE: 126 MMHG | BODY MASS INDEX: 26.76 KG/M2 | DIASTOLIC BLOOD PRESSURE: 70 MMHG | OXYGEN SATURATION: 98 % | WEIGHT: 191.13 LBS | RESPIRATION RATE: 18 BRPM

## 2019-01-22 LAB
HAV IGM SER QL: 2.1 MG/DL (ref 1.8–2.5)
POTASSIUM SERPL-SCNC: 4.1 MMOL/L (ref 3.6–5.1)

## 2019-01-22 PROCEDURE — 99217 OBSERVATION CARE DISCHARGE: CPT | Performed by: HOSPITALIST

## 2019-01-22 PROCEDURE — 99225 SUBSEQUENT OBSERVATION CARE: CPT | Performed by: NURSE PRACTITIONER

## 2019-01-22 NOTE — PROGRESS NOTES
[id number] [facility] Progress Note    Andrea Gupta Patient Status:  Observation    11/3/1980 MRN [id number]   [facility] 4NW-A Attending Sissy Means MD   Hosp Day # 0 PCP TRIP PULLIAM     Subjective:  Andrea Gupta is a balance observed and tandem gait    Labs:  Lab Results   Component Value Date    K 4.1 01/22/2019    MG 2.1 01/22/2019     Imaging:  No new imaging    Impression:  MS  Kidney stone    Plan:  No need for steroids intervention at this time  OK for discharge

## 2019-01-22 NOTE — PROGRESS NOTES
[facility] HOSPITALIST  Progress Note     Wandra Friendly Patient Status:  Observation    11/3/1980 MRN [id number]   [facility] 4NW-A Attending Kia Chang MD   Hosp Day # 0 PCP TRIP PULLIAM     Chief Complaint: Viral gastro, RLE weakness ASSESSMENT / PLAN:     1. Nausea, vomiting, diarrhea with sick contact, suspect viral gastroenteritis, resolved  1. Stop IVF  2. Diet as tolerated  2. Fever - UA, RVP & CXR negative  3. Dehydration, improved  4. Hypokalemia d/t GI loss, resolved  5.

## 2019-01-22 NOTE — DISCHARGE SUMMARY
[facility] HOSPITALIST  DISCHARGE SUMMARY     Judy Colvin Patient Status:  Observation    11/3/1980 MRN [id number]   [facility] 4NW-A Attending Kasandra Jefferson MD   Hosp Day # 0 PCP Carmina Page     Date of Admission: 2019  Date of Dis Quantity:  60 tablet  Refills:  5     COPAXONE 40 MG/ML Sosy  Generic drug:  Glatiramer Acetate      Inject 40 mg into the skin 3 (three) times a week.    Quantity:  12 Syringe  Refills:  11     diazepam 5 MG Tabs  Commonly known as:  VALIUM      Take 1 tab

## 2019-01-22 NOTE — PROGRESS NOTES
NURSING DISCHARGE NOTE    Discharged Home via Ambulatory. Accompanied by Family member  Belongings Taken by patient/family. Pt AOx4. No c/o pain. Appetite improved. No vomiting. No diarrhea. Ok'd for D/C by neuro as well.  Has follow up with neurolo

## 2019-02-01 DIAGNOSIS — G35 MULTIPLE SCLEROSIS (HCC): ICD-10-CM

## 2019-02-01 RX ORDER — GLATIRAMER ACETATE 40 MG/ML
40 INJECTION, SOLUTION SUBCUTANEOUS
Qty: 12 SYRINGE | Refills: 11 | Status: SHIPPED | OUTPATIENT
Start: 2019-02-01 | End: 2019-07-03

## 2019-02-01 NOTE — TELEPHONE ENCOUNTER
Pt is all out of his copaxone. [facility] told him they do not have any refills on file. Please send a new rx.

## 2019-02-01 NOTE — TELEPHONE ENCOUNTER
Medication: Copaxone    Date of last refill: 3/9/18  [place]    Last office visit: Visit date not found  Due back to clinic per last office note:  7/16/18  Date next office visit scheduled:    Future Appointments   Da

## 2019-02-13 ENCOUNTER — OFFICE VISIT (OUTPATIENT)
Dept: NEUROLOGY | Facility: CLINIC | Age: 39
End: 2019-02-13
Payer: COMMERCIAL

## 2019-02-13 VITALS
RESPIRATION RATE: 16 BRPM | WEIGHT: 191 LBS | BODY MASS INDEX: 26.74 KG/M2 | HEIGHT: 71 IN | SYSTOLIC BLOOD PRESSURE: 135 MMHG | DIASTOLIC BLOOD PRESSURE: 70 MMHG | HEART RATE: 95 BPM

## 2019-02-13 DIAGNOSIS — G35 MULTIPLE SCLEROSIS (HCC): ICD-10-CM

## 2019-02-13 DIAGNOSIS — G04.91 MYELITIS (HCC): ICD-10-CM

## 2019-02-13 PROCEDURE — 99214 OFFICE O/P EST MOD 30 MIN: CPT | Performed by: OTHER

## 2019-02-13 RX ORDER — TRAMADOL HYDROCHLORIDE 50 MG/1
TABLET ORAL
Qty: 60 TABLET | Refills: 5 | Status: SHIPPED | OUTPATIENT
Start: 2019-02-13 | End: 2019-05-29

## 2019-02-13 NOTE — PROGRESS NOTES
[facility] with Agustin  11/3/1980  Primary Care Provider:  Sammi Wick    2/13/2019  Accompanied visit:  (x) No ( ) yes, by:      41year old yo patient being seen for:  MS lateralizing    RELEVANT LABS and other DATA reviewed.        Problem/s Identified this visit:   Myelitis:  entire cord, nodular pattern  Multiple sclerosis ([facility] [address]): supra and infratentorial    Discussion on the case:  Stable clinically      Diagnostics/Order

## 2019-02-19 ENCOUNTER — HOSPITAL ENCOUNTER (OUTPATIENT)
Dept: MRI IMAGING | Age: 39
Discharge: HOME | End: 2019-02-19
Attending: Other
Payer: COMMERCIAL

## 2019-02-19 DIAGNOSIS — G04.91 MYELITIS (HCC): ICD-10-CM

## 2019-02-19 DIAGNOSIS — G35 MULTIPLE SCLEROSIS (HCC): ICD-10-CM

## 2019-02-19 PROCEDURE — 72157 MRI CHEST SPINE W/O & W/DYE: CPT | Performed by: OTHER

## 2019-02-19 PROCEDURE — 72156 MRI NECK SPINE W/O & W/DYE: CPT | Performed by: OTHER

## 2019-02-19 PROCEDURE — A9575 INJ GADOTERATE MEGLUMI 0.1ML: HCPCS | Performed by: OTHER

## 2019-02-19 PROCEDURE — A9575 INJ GADOTERATE MEGLUMI 0.1ML: HCPCS

## 2019-03-06 ENCOUNTER — TELEPHONE (OUTPATIENT)
Dept: NEUROLOGY | Facility: CLINIC | Age: 39
End: 2019-03-06

## 2019-03-13 ENCOUNTER — TELEPHONE (OUTPATIENT)
Dept: NEUROLOGY | Facility: CLINIC | Age: 39
End: 2019-03-13

## 2019-03-13 ENCOUNTER — NURSE ONLY (OUTPATIENT)
Dept: NEUROLOGY | Facility: CLINIC | Age: 39
End: 2019-03-13
Payer: COMMERCIAL

## 2019-03-13 VITALS
HEART RATE: 81 BPM | OXYGEN SATURATION: 99 % | SYSTOLIC BLOOD PRESSURE: 115 MMHG | RESPIRATION RATE: 16 BRPM | DIASTOLIC BLOOD PRESSURE: 58 MMHG

## 2019-03-13 DIAGNOSIS — G35 MULTIPLE SCLEROSIS (HCC): Primary | ICD-10-CM

## 2019-03-13 DIAGNOSIS — F19.982 INSOMNIA DUE TO DRUG (HCC): ICD-10-CM

## 2019-03-13 PROCEDURE — 96365 THER/PROPH/DIAG IV INF INIT: CPT | Performed by: OTHER

## 2019-03-13 RX ORDER — ZOLPIDEM TARTRATE 10 MG/1
10 TABLET ORAL NIGHTLY PRN
Qty: 10 TABLET | Refills: 0 | Status: SHIPPED
Start: 2019-03-13 | End: 2019-05-15

## 2019-03-13 RX ORDER — METHYLPREDNISOLONE SODIUM SUCCINATE 500 MG/1
500 INJECTION, POWDER, FOR SOLUTION INTRAMUSCULAR; INTRAVENOUS ONCE
Status: SHIPPED | OUTPATIENT
Start: 2019-03-13

## 2019-03-13 RX ADMIN — METHYLPREDNISOLONE SODIUM SUCCINATE 500 MG: 500 INJECTION, POWDER, FOR SOLUTION INTRAMUSCULAR; INTRAVENOUS at 10:51:00

## 2019-03-13 NOTE — PROGRESS NOTES
IV started per Lamberto Sullivan RN into RAC and Solu-Medrol infused over 20 minutes without difficulty. IV discontinued per RN. Patient tolerated infusion well with no complications.

## 2019-03-13 NOTE — TELEPHONE ENCOUNTER
Medication: Zolpidem Tartrate 10MG    Date of last refill: 10/30/2018 (#10/0)  [place] 43/32/0151    Last office visit: 2/13/2019  Due back to clinic per last office note:  07/22/2019  Date next office visit scheduled:

## 2019-03-25 RX ORDER — METHYLPREDNISOLONE SODIUM SUCCINATE 500 MG/1
500 INJECTION, POWDER, FOR SOLUTION INTRAMUSCULAR; INTRAVENOUS ONCE
Status: COMPLETED | OUTPATIENT
Start: 2019-03-13 | End: 2019-03-13

## 2019-03-28 ENCOUNTER — NURSE ONLY (OUTPATIENT)
Dept: NEUROLOGY | Facility: CLINIC | Age: 39
End: 2019-03-28
Payer: COMMERCIAL

## 2019-03-28 VITALS — HEART RATE: 68 BPM | DIASTOLIC BLOOD PRESSURE: 68 MMHG | RESPIRATION RATE: 18 BRPM | SYSTOLIC BLOOD PRESSURE: 128 MMHG

## 2019-03-28 DIAGNOSIS — G35 MULTIPLE SCLEROSIS (HCC): ICD-10-CM

## 2019-03-28 PROCEDURE — 96365 THER/PROPH/DIAG IV INF INIT: CPT | Performed by: OTHER

## 2019-03-28 RX ORDER — METHYLPREDNISOLONE SODIUM SUCCINATE 500 MG/1
500 INJECTION, POWDER, FOR SOLUTION INTRAMUSCULAR; INTRAVENOUS ONCE
Status: COMPLETED | OUTPATIENT
Start: 2019-03-28 | End: 2019-03-28

## 2019-03-28 RX ADMIN — METHYLPREDNISOLONE SODIUM SUCCINATE 500 MG: 500 INJECTION, POWDER, FOR SOLUTION INTRAMUSCULAR; INTRAVENOUS at 10:56:00

## 2019-03-28 NOTE — PROGRESS NOTES
IV started per Wali Payne RN into right antecubital site and Solu-Medrol infused over 20 minutes without difficulty. IV discontinued per RN. Patient tolerated infusion well with no complications.

## 2019-05-15 ENCOUNTER — NURSE ONLY (OUTPATIENT)
Dept: NEUROLOGY | Facility: CLINIC | Age: 39
End: 2019-05-15
Payer: COMMERCIAL

## 2019-05-15 VITALS — SYSTOLIC BLOOD PRESSURE: 123 MMHG | HEART RATE: 78 BPM | DIASTOLIC BLOOD PRESSURE: 85 MMHG | RESPIRATION RATE: 15 BRPM

## 2019-05-15 DIAGNOSIS — F19.982 INSOMNIA DUE TO DRUG (HCC): ICD-10-CM

## 2019-05-15 DIAGNOSIS — G35 MULTIPLE SCLEROSIS (HCC): Primary | ICD-10-CM

## 2019-05-15 PROCEDURE — 96365 THER/PROPH/DIAG IV INF INIT: CPT | Performed by: OTHER

## 2019-05-15 RX ORDER — METHYLPREDNISOLONE SODIUM SUCCINATE 500 MG/1
500 INJECTION, POWDER, FOR SOLUTION INTRAMUSCULAR; INTRAVENOUS ONCE
Status: COMPLETED | OUTPATIENT
Start: 2019-05-15 | End: 2019-05-15

## 2019-05-15 RX ORDER — ZOLPIDEM TARTRATE 10 MG/1
10 TABLET ORAL NIGHTLY PRN
Qty: 10 TABLET | Refills: 0 | OUTPATIENT
Start: 2019-05-15 | End: 2020-03-11

## 2019-05-15 RX ADMIN — METHYLPREDNISOLONE SODIUM SUCCINATE 500 MG: 500 INJECTION, POWDER, FOR SOLUTION INTRAMUSCULAR; INTRAVENOUS at 15:33:00

## 2019-05-15 NOTE — PROGRESS NOTES
Called [facility] pharmacy for Zolpidem tartrate 10 mg once a day PRN for sleep. Dr Laura jim.

## 2019-05-15 NOTE — PROGRESS NOTES
IV started per aMcho Erazo RN into RAC and Solu-Medrol infused over 20 minutes without difficulty. IV discontinued per RN. Patient tolerated infusion well with no complications.

## 2019-05-15 NOTE — TELEPHONE ENCOUNTER
Medication: Zolpidem Tartrate 10 MG oral Tab    Date of last refill: 03/13/2019 (#10/0)  [place] 18/18/9822    Last office visit: 2/13/2019  Due back to clinic per last office note:    Date next office visit scheduled:

## 2019-05-16 RX ORDER — ZOLPIDEM TARTRATE 10 MG/1
TABLET ORAL
Qty: 10 TABLET | Refills: 0 | OUTPATIENT
Start: 2019-05-16

## 2019-05-29 DIAGNOSIS — G35 MULTIPLE SCLEROSIS (HCC): ICD-10-CM

## 2019-05-29 DIAGNOSIS — G04.91 MYELITIS (HCC): ICD-10-CM

## 2019-05-29 RX ORDER — TRAMADOL HYDROCHLORIDE 50 MG/1
TABLET ORAL
Qty: 60 TABLET | Refills: 5 | Status: SHIPPED
Start: 2019-05-29 | End: 2019-09-05

## 2019-05-29 NOTE — TELEPHONE ENCOUNTER
Medication: tramadol    Date of last refill: 2/13/19 (#60, + 5 refills)  [place] 4/83/38    Last office visit: 2/13/2019  Due back to clinic per last office note:  6 months  Date next office visit scheduled:    Future Ap

## 2019-05-30 RX ORDER — TRAMADOL HYDROCHLORIDE 50 MG/1
TABLET ORAL
Qty: 60 TABLET | Refills: 0 | OUTPATIENT
Start: 2019-05-30

## 2019-05-30 RX ORDER — BACLOFEN 10 MG/1
TABLET ORAL
Qty: 60 TABLET | Refills: 5 | Status: SHIPPED | OUTPATIENT
Start: 2019-05-30 | End: 2020-08-07

## 2019-05-30 NOTE — TELEPHONE ENCOUNTER
Medication: Baclofen 10 mg     Date of last refill: 04/30/18 with 5 addt refills  [place]      Last office visit: 2/13/2019  Due back to clinic per last office note:  6 months  Date next office visit scheduled:

## 2019-06-26 ENCOUNTER — NURSE ONLY (OUTPATIENT)
Dept: NEUROLOGY | Facility: CLINIC | Age: 39
End: 2019-06-26

## 2019-06-26 ENCOUNTER — TELEPHONE (OUTPATIENT)
Dept: NEUROLOGY | Facility: CLINIC | Age: 39
End: 2019-06-26

## 2019-06-26 VITALS — HEART RATE: 86 BPM | SYSTOLIC BLOOD PRESSURE: 125 MMHG | DIASTOLIC BLOOD PRESSURE: 65 MMHG

## 2019-06-26 DIAGNOSIS — G35 MULTIPLE SCLEROSIS (HCC): Primary | ICD-10-CM

## 2019-06-26 PROCEDURE — 96365 THER/PROPH/DIAG IV INF INIT: CPT | Performed by: OTHER

## 2019-06-26 RX ORDER — METHYLPREDNISOLONE SODIUM SUCCINATE 500 MG/1
500 INJECTION, POWDER, FOR SOLUTION INTRAMUSCULAR; INTRAVENOUS ONCE
Status: COMPLETED | OUTPATIENT
Start: 2019-06-26 | End: 2019-06-26

## 2019-06-26 RX ADMIN — METHYLPREDNISOLONE SODIUM SUCCINATE 500 MG: 500 INJECTION, POWDER, FOR SOLUTION INTRAMUSCULAR; INTRAVENOUS at 15:27:00

## 2019-06-26 NOTE — PROGRESS NOTES
IV started per John Flores, RN into LAC and Solu-Medrol infused over 20 minutes without difficulty. IV discontinued per RN. Patient tolerated infusion well with no complications.

## 2019-07-02 NOTE — TELEPHONE ENCOUNTER
[facility] in [place], [place] received [place] rx from [facility] dated 12/4/17. Explained that was sent in error to mail order, then resent to [facility] in [place]. Tech able to see pt picked up rx on 12/5.   They will cancel the rx they received fr
02-Jul-2019 22:19

## 2019-07-03 DIAGNOSIS — G35 MULTIPLE SCLEROSIS (HCC): Primary | ICD-10-CM

## 2019-07-03 RX ORDER — GLATIRAMER 40 MG/ML
40 INJECTION, SOLUTION SUBCUTANEOUS
Qty: 12 SYRINGE | Refills: 5 | Status: SHIPPED | OUTPATIENT
Start: 2019-07-03 | End: 2019-07-09

## 2019-07-03 NOTE — TELEPHONE ENCOUNTER
Copaxone is not covered but Ruby Ch is with new insurance. Send script to special pharmacy [facility] specialty pharmacy. Fax [phone]. Health benefits agency.  [phone] Liu (director of pharm services)

## 2019-07-09 ENCOUNTER — TELEPHONE (OUTPATIENT)
Dept: NEUROLOGY | Facility: CLINIC | Age: 39
End: 2019-07-09

## 2019-07-09 DIAGNOSIS — G35 MULTIPLE SCLEROSIS (HCC): ICD-10-CM

## 2019-07-09 RX ORDER — GLATIRAMER ACETATE 40 MG/ML
40 INJECTION, SOLUTION SUBCUTANEOUS
Qty: 12 SYRINGE | Refills: 11 | Status: SHIPPED | OUTPATIENT
Start: 2019-07-10 | End: 2019-08-07

## 2019-07-09 NOTE — TELEPHONE ENCOUNTER
Request received from patient's pharmacy asking for new prescription for brand name Aris Minor. Prescription resent to pharmacy as requested.

## 2019-07-10 NOTE — TELEPHONE ENCOUNTER
Explained to Tasha Renee to call patient billing and give your new insurance information for solumedrol bill. All the questions answered and patient verbalized the understanding.

## 2019-08-07 ENCOUNTER — TELEPHONE (OUTPATIENT)
Dept: NEUROLOGY | Facility: CLINIC | Age: 39
End: 2019-08-07

## 2019-08-07 NOTE — TELEPHONE ENCOUNTER
Pt unable to make solumedrol appts during [facility] hours due to work. Call pt to discuss setting up solumedrol appt at [facility].

## 2019-08-08 NOTE — TELEPHONE ENCOUNTER
LM relaying recommendation to change steroid infusion from schedule (monthly/pulsed) to as-needed basis for MS flare.

## 2019-08-08 NOTE — TELEPHONE ENCOUNTER
Patient was stable at recent visit and most recent MRIs are stable so would just treat with solu-medrol for flare-ups at this time.

## 2019-08-30 ENCOUNTER — TELEPHONE (OUTPATIENT)
Dept: NEUROLOGY | Facility: CLINIC | Age: 39
End: 2019-08-30

## 2019-09-05 DIAGNOSIS — G35 MULTIPLE SCLEROSIS (HCC): ICD-10-CM

## 2019-09-05 DIAGNOSIS — G04.91 MYELITIS (HCC): ICD-10-CM

## 2019-09-06 RX ORDER — TRAMADOL HYDROCHLORIDE 50 MG/1
TABLET ORAL
Qty: 60 TABLET | Refills: 0 | Status: SHIPPED
Start: 2019-09-06 | End: 2019-09-23

## 2019-09-06 NOTE — TELEPHONE ENCOUNTER
Medication: Tramadol 50 mg     Date of last refill: 04/30/18 with 5 addt refills  [place] 08/22/2019     Last office visit: 2/13/2019  Due back to clinic per last office note:  6 months  Date next office visit scheduled:

## 2019-09-22 DIAGNOSIS — G35 MULTIPLE SCLEROSIS (HCC): ICD-10-CM

## 2019-09-22 DIAGNOSIS — G04.91 MYELITIS (HCC): ICD-10-CM

## 2019-09-22 RX ORDER — TRAMADOL HYDROCHLORIDE 50 MG/1
50 TABLET ORAL
Qty: 60 TABLET | Refills: 0 | Status: CANCELLED | OUTPATIENT
Start: 2019-09-22

## 2019-09-23 RX ORDER — TRAMADOL HYDROCHLORIDE 50 MG/1
50 TABLET ORAL EVERY 6 HOURS PRN
Qty: 60 TABLET | Refills: 2 | Status: SHIPPED
Start: 2019-09-23 | End: 2019-11-13

## 2019-09-23 NOTE — TELEPHONE ENCOUNTER
Medication: Tramadol 50 mg     Date of last refill: 09/06/2019  [place] 08/22/2019     Last office visit: 2/13/2019  Due back to clinic per last office note:  6 months  Date next office visit scheduled:

## 2019-11-06 ENCOUNTER — TELEPHONE (OUTPATIENT)
Dept: NEUROLOGY | Facility: CLINIC | Age: 39
End: 2019-11-06

## 2019-11-06 NOTE — TELEPHONE ENCOUNTER
Darrel Dockery is having numbness in his left leg and would like to have IV solumedrol today. Last IV solumedrol received on  06/26/2019. Will notify Dr Yanelis Berumen and ask for recommendation.

## 2019-11-07 ENCOUNTER — NURSE ONLY (OUTPATIENT)
Dept: NEUROLOGY | Facility: CLINIC | Age: 39
End: 2019-11-07
Payer: COMMERCIAL

## 2019-11-07 ENCOUNTER — TELEPHONE (OUTPATIENT)
Dept: NEUROLOGY | Facility: CLINIC | Age: 39
End: 2019-11-07

## 2019-11-07 VITALS — RESPIRATION RATE: 16 BRPM | HEART RATE: 72 BPM | SYSTOLIC BLOOD PRESSURE: 112 MMHG | DIASTOLIC BLOOD PRESSURE: 80 MMHG

## 2019-11-07 DIAGNOSIS — G35 MULTIPLE SCLEROSIS (HCC): Primary | ICD-10-CM

## 2019-11-07 RX ORDER — PREDNISONE 20 MG/1
TABLET ORAL
Qty: 30 TABLET | Refills: 0 | Status: SHIPPED | OUTPATIENT
Start: 2019-11-10 | End: 2020-03-11 | Stop reason: ALTCHOICE

## 2019-11-07 RX ORDER — METHYLPREDNISOLONE SODIUM SUCCINATE 500 MG/1
500 INJECTION, POWDER, FOR SOLUTION INTRAMUSCULAR; INTRAVENOUS ONCE
Status: COMPLETED | OUTPATIENT
Start: 2019-11-07 | End: 2019-11-07

## 2019-11-07 RX ADMIN — METHYLPREDNISOLONE SODIUM SUCCINATE 500 MG: 500 INJECTION, POWDER, FOR SOLUTION INTRAMUSCULAR; INTRAVENOUS at 14:34:00

## 2019-11-07 NOTE — PROGRESS NOTES
IV started per AMISH Fernandes and Solu-Medrol infused over 20 minutes without difficulty. IV discontinued per RN. Patient tolerated infusion well with no complications.

## 2019-11-07 NOTE — TELEPHONE ENCOUNTER
Pt presented to office for IV solumedrol. Pt needs two additional days for IV Solumedrol 500mg. PA referral initiated. Therapy Plan updated in Epic, prednisone taper ordered. Will notify cancer center when approved.

## 2019-11-07 NOTE — TELEPHONE ENCOUNTER
[address] [place] [address] and informed Vahid Linder that pt needed to infuse Friday and Saturday for 500mg IV Solumedrol.

## 2019-11-07 NOTE — TELEPHONE ENCOUNTER
Spoke with Maya at [facility] of [facility]  Reference #:[id number]    Time:14:07    Per Maya No Prior Authorization is Required for IV Solumedrol (,96158) done Outpatient but Pre Determination is Optional.     Clinical Information for Pr

## 2019-11-08 ENCOUNTER — OFFICE VISIT (OUTPATIENT)
Dept: HEMATOLOGY/ONCOLOGY | Facility: HOSPITAL | Age: 39
End: 2019-11-08
Attending: Other
Payer: COMMERCIAL

## 2019-11-08 ENCOUNTER — TELEPHONE (OUTPATIENT)
Dept: HEMATOLOGY/ONCOLOGY | Facility: HOSPITAL | Age: 39
End: 2019-11-08

## 2019-11-08 DIAGNOSIS — G35 MULTIPLE SCLEROSIS (HCC): Primary | ICD-10-CM

## 2019-11-08 PROCEDURE — 96365 THER/PROPH/DIAG IV INF INIT: CPT

## 2019-11-08 NOTE — PROGRESS NOTES
Education Record    Learner:  Patient    Disease / Diagnosis:    Barriers / Limitations:  None   Comments:    Method:  Discussion   Comments:    General Topics:  Medication and Plan of care reviewed   Comments:    Outcome:  Shows understanding   Comments:P

## 2019-11-09 ENCOUNTER — OFFICE VISIT (OUTPATIENT)
Dept: HEMATOLOGY/ONCOLOGY | Facility: HOSPITAL | Age: 39
End: 2019-11-09
Attending: Other
Payer: COMMERCIAL

## 2019-11-09 VITALS
TEMPERATURE: 98 F | DIASTOLIC BLOOD PRESSURE: 83 MMHG | RESPIRATION RATE: 18 BRPM | OXYGEN SATURATION: 100 % | HEART RATE: 58 BPM | SYSTOLIC BLOOD PRESSURE: 145 MMHG

## 2019-11-09 DIAGNOSIS — G35 MULTIPLE SCLEROSIS (HCC): Primary | ICD-10-CM

## 2019-11-09 PROCEDURE — 96365 THER/PROPH/DIAG IV INF INIT: CPT

## 2019-11-09 NOTE — PROGRESS NOTES
Education Record    Learner:  Patient    Disease / Diagnosis: Multiple sclerosis - IV solumedrol infusion    Barriers / Limitations:  None   Comments:    Method:  Brief focused and Reinforcement   Comments:    General Topics:  Plan of care reviewed   Teresa

## 2019-11-10 ENCOUNTER — APPOINTMENT (OUTPATIENT)
Dept: HEMATOLOGY/ONCOLOGY | Facility: HOSPITAL | Age: 39
End: 2019-11-10
Attending: Other
Payer: COMMERCIAL

## 2019-11-11 NOTE — TELEPHONE ENCOUNTER
Received Pre Determination Approval from [address] [place] [address]     Prior authorization request completed for: IV Solumedrol   Authorization [id number]    Authorization dates: N/A   CPT codes approved:  and 76604  Number of visits/dates of service approved

## 2019-11-13 DIAGNOSIS — G35 MULTIPLE SCLEROSIS (HCC): ICD-10-CM

## 2019-11-13 DIAGNOSIS — G04.91 MYELITIS (HCC): ICD-10-CM

## 2019-11-13 RX ORDER — TRAMADOL HYDROCHLORIDE 50 MG/1
50 TABLET ORAL EVERY 6 HOURS PRN
Qty: 60 TABLET | Refills: 2 | Status: CANCELLED | OUTPATIENT
Start: 2019-11-13

## 2019-11-13 NOTE — TELEPHONE ENCOUNTER
Rx was refilled on 9/29 with 2 refills. RN will call pt to notify him that he has 2 refills. RN left message for pt about the refills.

## 2019-11-14 RX ORDER — TRAMADOL HYDROCHLORIDE 50 MG/1
50 TABLET ORAL 2 TIMES DAILY PRN
Qty: 60 TABLET | Refills: 0 | Status: SHIPPED
Start: 2019-11-14 | End: 2019-12-15

## 2019-11-14 NOTE — TELEPHONE ENCOUNTER
LMTCB regarding refill on Tramadol. How many tabs patient is taking and what is he taking the medication for.

## 2019-11-14 NOTE — TELEPHONE ENCOUNTER
Spoke to patient and his pcp told him to  wean off tramadol and he is taking 1 tab bid for two months and stop.

## 2019-11-15 NOTE — TELEPHONE ENCOUNTER
[facility] calling to confirm quantity of tramadol. They received refill voice mail but unable to understand quantity. Per chart quantity of 60, no refills.

## 2019-12-15 DIAGNOSIS — G35 MULTIPLE SCLEROSIS (HCC): ICD-10-CM

## 2019-12-15 DIAGNOSIS — G04.91 MYELITIS (HCC): ICD-10-CM

## 2019-12-16 RX ORDER — TRAMADOL HYDROCHLORIDE 50 MG/1
TABLET ORAL
Qty: 60 TABLET | Refills: 1 | Status: SHIPPED
Start: 2019-12-16 | End: 2020-02-11

## 2020-01-27 ENCOUNTER — TELEPHONE (OUTPATIENT)
Dept: NEUROLOGY | Facility: CLINIC | Age: 40
End: 2020-01-27

## 2020-01-27 DIAGNOSIS — G35 MULTIPLE SCLEROSIS (HCC): Primary | ICD-10-CM

## 2020-01-27 NOTE — TELEPHONE ENCOUNTER
[facility] Copaxone 40MG/ML. Send to plan.     Shalonda Moralez (Britt: Annamarie Haines)

## 2020-02-11 DIAGNOSIS — G04.91 MYELITIS (HCC): ICD-10-CM

## 2020-02-11 DIAGNOSIS — G35 MULTIPLE SCLEROSIS (HCC): ICD-10-CM

## 2020-02-12 RX ORDER — TRAMADOL HYDROCHLORIDE 50 MG/1
50 TABLET ORAL 2 TIMES DAILY PRN
Qty: 60 TABLET | Refills: 1 | Status: SHIPPED | OUTPATIENT
Start: 2020-02-12 | End: 2020-04-03

## 2020-02-12 NOTE — TELEPHONE ENCOUNTER
Medication: Tramadol 50 MG      Date of last refill: 12/16/2019 with 1 addt refills  [place] 41/71/2110     Last office visit: 02/13/2019  Due back to clinic per last office note:  6-8 months  Date next office visit s

## 2020-03-11 ENCOUNTER — OFFICE VISIT (OUTPATIENT)
Dept: NEUROLOGY | Facility: CLINIC | Age: 40
End: 2020-03-11
Payer: COMMERCIAL

## 2020-03-11 VITALS
SYSTOLIC BLOOD PRESSURE: 132 MMHG | BODY MASS INDEX: 26.74 KG/M2 | DIASTOLIC BLOOD PRESSURE: 80 MMHG | HEIGHT: 71 IN | RESPIRATION RATE: 16 BRPM | HEART RATE: 74 BPM | WEIGHT: 191 LBS

## 2020-03-11 DIAGNOSIS — R53.83 OTHER FATIGUE: ICD-10-CM

## 2020-03-11 DIAGNOSIS — F19.982 INSOMNIA DUE TO DRUG (HCC): ICD-10-CM

## 2020-03-11 DIAGNOSIS — G35 MULTIPLE SCLEROSIS (HCC): Primary | ICD-10-CM

## 2020-03-11 PROCEDURE — 99214 OFFICE O/P EST MOD 30 MIN: CPT | Performed by: PHYSICIAN ASSISTANT

## 2020-03-11 PROCEDURE — 96374 THER/PROPH/DIAG INJ IV PUSH: CPT | Performed by: PHYSICIAN ASSISTANT

## 2020-03-11 RX ORDER — GLATIRAMER 40 MG/ML
INJECTION, SOLUTION SUBCUTANEOUS
COMMUNITY
End: 2020-04-21

## 2020-03-11 RX ORDER — MODAFINIL 200 MG/1
TABLET ORAL
Qty: 30 TABLET | Refills: 2 | Status: SHIPPED | OUTPATIENT
Start: 2020-03-11 | End: 2020-12-14

## 2020-03-11 RX ORDER — METHYLPREDNISOLONE SODIUM SUCCINATE 500 MG/1
500 INJECTION, POWDER, FOR SOLUTION INTRAMUSCULAR; INTRAVENOUS ONCE
Status: COMPLETED | OUTPATIENT
Start: 2020-03-11 | End: 2020-03-11

## 2020-03-11 RX ORDER — ZOLPIDEM TARTRATE 10 MG/1
10 TABLET ORAL NIGHTLY PRN
Qty: 5 TABLET | Refills: 0 | Status: SHIPPED | OUTPATIENT
Start: 2020-03-11 | End: 2020-03-12

## 2020-03-11 RX ADMIN — METHYLPREDNISOLONE SODIUM SUCCINATE 500 MG: 500 INJECTION, POWDER, FOR SOLUTION INTRAMUSCULAR; INTRAVENOUS at 14:46:00

## 2020-03-11 NOTE — PROGRESS NOTES
[facility] with [facility]  Nettie Sawant  11/3/1980  Primary Care Provider:  Jl Infante    3/11/2020  Accompanied visit: No      46year old male patient being seen for: Multiple Sclerosis Tab, Take 1 tablet (50 mg total) by mouth 2 (two) times daily as needed for Pain., Disp: 60 tablet, Rfl: 1  •  baclofen 10 MG Oral Tab, TAKE ONE-HALF TABLET BY MOUTH TWICE DAILY AS NEEDED, Disp: 60 tablet, Rfl: 5  •  Multiple Vitamin (ONE-DAILY MULTI VITAM NICK Hernandez  3/11/2020, Time completed 2:04 PM

## 2020-03-11 NOTE — PROGRESS NOTES
IV started per Chantel Ellison RN into RAC and Solu-Medrol infused over 20 minutes without difficulty. IV discontinued per RN. Patient tolerated infusion well with no complications.

## 2020-03-11 NOTE — TELEPHONE ENCOUNTER
Patient requesting Medication: Zolpidem 10 MG PRN for sleeping. Date of last refill: 05/15/2019 (#10/0)    Patient received IV Solumedrol 500 MG in [facility].

## 2020-03-12 ENCOUNTER — NURSE ONLY (OUTPATIENT)
Dept: NEUROLOGY | Facility: CLINIC | Age: 40
End: 2020-03-12
Payer: COMMERCIAL

## 2020-03-12 ENCOUNTER — TELEPHONE (OUTPATIENT)
Dept: NEUROLOGY | Facility: CLINIC | Age: 40
End: 2020-03-12

## 2020-03-12 VITALS — HEART RATE: 72 BPM | RESPIRATION RATE: 16 BRPM | DIASTOLIC BLOOD PRESSURE: 70 MMHG | SYSTOLIC BLOOD PRESSURE: 120 MMHG

## 2020-03-12 DIAGNOSIS — G35 MULTIPLE SCLEROSIS (HCC): ICD-10-CM

## 2020-03-12 DIAGNOSIS — G35 MULTIPLE SCLEROSIS (HCC): Primary | ICD-10-CM

## 2020-03-12 DIAGNOSIS — F19.982 INSOMNIA DUE TO DRUG (HCC): ICD-10-CM

## 2020-03-12 PROCEDURE — 96365 THER/PROPH/DIAG IV INF INIT: CPT | Performed by: OTHER

## 2020-03-12 RX ORDER — METHYLPREDNISOLONE SODIUM SUCCINATE 500 MG/1
500 INJECTION, POWDER, FOR SOLUTION INTRAMUSCULAR; INTRAVENOUS ONCE
Status: COMPLETED | OUTPATIENT
Start: 2020-03-12 | End: 2020-03-12

## 2020-03-12 RX ORDER — ZOLPIDEM TARTRATE 10 MG/1
10 TABLET ORAL NIGHTLY PRN
Qty: 5 TABLET | Refills: 0 | Status: SHIPPED | OUTPATIENT
Start: 2020-03-12 | End: 2020-06-25 | Stop reason: ALTCHOICE

## 2020-03-12 RX ADMIN — METHYLPREDNISOLONE SODIUM SUCCINATE 500 MG: 500 INJECTION, POWDER, FOR SOLUTION INTRAMUSCULAR; INTRAVENOUS at 14:59:00

## 2020-03-12 NOTE — TELEPHONE ENCOUNTER
Patient requesting a paper copy of his Ambien script. Went to  prescription yesterday and they were out of medication. Requesting a paper replacement.

## 2020-03-12 NOTE — PROGRESS NOTES
IV started per Demetra Goodpasture RN into left antecubital site and Solu-Medrol infused over 20 minutes without difficulty. IV discontinued per RN. Patient tolerated infusion well with no complications. Patient states he does not need infusion tomorrow at this time.

## 2020-03-12 NOTE — TELEPHONE ENCOUNTER
Spoke with Debbie Hernandez at [facility] of [address]  Reference #:[id number]     Time:41:29    Per Yisel No Prior Authorization is Required for Solumedrol (,90099) Outpatient/In Office, but Pre Determination is Optional.     Faxed Clinical Information to

## 2020-03-13 NOTE — TELEPHONE ENCOUNTER
Received Letter from [address] [place] [address] regarding Pre Determination Solumedrol (,95043)     Letter indicates : We reviewed your request for the pre approval of services for the member.  We cannot approve request for the following reason:    -The service

## 2020-04-03 ENCOUNTER — TELEPHONE (OUTPATIENT)
Dept: NEUROLOGY | Facility: CLINIC | Age: 40
End: 2020-04-03

## 2020-04-03 DIAGNOSIS — G04.91 MYELITIS (HCC): ICD-10-CM

## 2020-04-03 DIAGNOSIS — G35 MULTIPLE SCLEROSIS (HCC): ICD-10-CM

## 2020-04-03 RX ORDER — TRAMADOL HYDROCHLORIDE 50 MG/1
50 TABLET ORAL 2 TIMES DAILY PRN
Qty: 60 TABLET | Refills: 1 | Status: SHIPPED | OUTPATIENT
Start: 2020-04-03 | End: 2020-06-02

## 2020-04-03 RX ORDER — TRAMADOL HYDROCHLORIDE 50 MG/1
50 TABLET ORAL 2 TIMES DAILY PRN
Qty: 60 TABLET | Refills: 1 | Status: CANCELLED | OUTPATIENT
Start: 2020-04-03

## 2020-04-03 NOTE — TELEPHONE ENCOUNTER
Medication: tramadol    Date of last refill: 2/12/20 (#60 + 1 refill)  [place] 5/24/40    Last office visit: 3/11/20  Due back to clinic per last office note:  6 months  Date next office visit scheduled:  No future appoi

## 2020-04-07 NOTE — TELEPHONE ENCOUNTER
Patient contacted office stating refill for Tramadol was incorrectly sent to [facility]. Needs prescription sent to [facility].  Order phoned in for Tramadol #60 with one refill.

## 2020-04-08 DIAGNOSIS — G04.91 MYELITIS (HCC): ICD-10-CM

## 2020-04-08 DIAGNOSIS — G35 MULTIPLE SCLEROSIS (HCC): ICD-10-CM

## 2020-04-08 RX ORDER — TRAMADOL HYDROCHLORIDE 50 MG/1
TABLET ORAL
Qty: 60 TABLET | Refills: 0 | OUTPATIENT
Start: 2020-04-08

## 2020-04-08 NOTE — TELEPHONE ENCOUNTER
Patient called on 4/7/2020 asking for Tramadol to be sent to [facility] pharmacy. Prescription was phoned in as prescribed. Refill not appropriate at [facility] at this time. Refill denied.

## 2020-04-21 ENCOUNTER — TELEPHONE (OUTPATIENT)
Dept: NEUROLOGY | Facility: CLINIC | Age: 40
End: 2020-04-21

## 2020-04-21 DIAGNOSIS — G35 MULTIPLE SCLEROSIS (HCC): Primary | ICD-10-CM

## 2020-04-21 RX ORDER — GLATIRAMER 40 MG/ML
40 INJECTION, SOLUTION SUBCUTANEOUS
Qty: 12 SYRINGE | Refills: 11 | Status: SHIPPED
Start: 2020-04-22 | End: 2021-03-08

## 2020-04-21 NOTE — TELEPHONE ENCOUNTER
Glatiramer Acetate refill    Send to:  Fax:  [phone]    Call pt to clarify correct medication for ms and pharmacy to fax rx.

## 2020-04-22 NOTE — TELEPHONE ENCOUNTER
[facility] called advising us to fill out an application that needs to be filled out by prescriber. Glatiramer Acetate. Will fax over application.   After receive and fill it out then fax back to begin refill process

## 2020-04-23 NOTE — TELEPHONE ENCOUNTER
Pt has completed his part of the pt assistance application and forwarded it to the program.  They only need our portion of the application to be sent to them. Advised pt we have received the application.   We will complete provider section and fax back for

## 2020-04-23 NOTE — TELEPHONE ENCOUNTER
Provider information for the [facility] PAF completed and faxed to [phone] with receipt confirmation.

## 2020-04-28 NOTE — TELEPHONE ENCOUNTER
Fax received from [employer] patient assistance [employer]. Patient has been approved to receive [facility] until 12/31/2020 at no out of pocket cost as long as all program eligibility criteria continue to be met.

## 2020-06-02 DIAGNOSIS — G35 MULTIPLE SCLEROSIS (HCC): ICD-10-CM

## 2020-06-02 DIAGNOSIS — G04.91 MYELITIS (HCC): ICD-10-CM

## 2020-06-03 RX ORDER — TRAMADOL HYDROCHLORIDE 50 MG/1
50 TABLET ORAL 2 TIMES DAILY PRN
Qty: 60 TABLET | Refills: 1 | Status: SHIPPED | OUTPATIENT
Start: 2020-06-03 | End: 2020-07-27

## 2020-06-03 NOTE — TELEPHONE ENCOUNTER
Medication: Tramadol    Date of last refill: 4/3/2020 (#60/1)  [place] for [place] medication    Last office visit: 3/11/2020 with Shakira Gaspar  Due back to clinic per last office note:  RTC 6 months due Sept 2020    Da

## 2020-06-24 ENCOUNTER — TELEPHONE (OUTPATIENT)
Dept: NEUROLOGY | Facility: CLINIC | Age: 40
End: 2020-06-24

## 2020-06-25 ENCOUNTER — TELEPHONE (OUTPATIENT)
Dept: NEUROLOGY | Facility: CLINIC | Age: 40
End: 2020-06-25

## 2020-06-25 ENCOUNTER — OFFICE VISIT (OUTPATIENT)
Dept: NEUROLOGY | Facility: CLINIC | Age: 40
End: 2020-06-25
Payer: COMMERCIAL

## 2020-06-25 VITALS
DIASTOLIC BLOOD PRESSURE: 68 MMHG | HEART RATE: 88 BPM | TEMPERATURE: 98 F | SYSTOLIC BLOOD PRESSURE: 118 MMHG | RESPIRATION RATE: 20 BRPM

## 2020-06-25 DIAGNOSIS — G35 MULTIPLE SCLEROSIS (HCC): Primary | ICD-10-CM

## 2020-06-25 PROCEDURE — 99214 OFFICE O/P EST MOD 30 MIN: CPT | Performed by: PHYSICIAN ASSISTANT

## 2020-06-25 RX ORDER — PREDNISONE 20 MG/1
TABLET ORAL
Qty: 30 TABLET | Refills: 0 | Status: SHIPPED | OUTPATIENT
Start: 2020-06-25 | End: 2020-11-11 | Stop reason: ALTCHOICE

## 2020-06-25 RX ORDER — PREDNISONE 20 MG/1
TABLET ORAL
Qty: 5 TABLET | Refills: 0 | Status: SHIPPED | OUTPATIENT
Start: 2020-06-25 | End: 2020-11-11 | Stop reason: ALTCHOICE

## 2020-06-25 NOTE — PROGRESS NOTES
[facility] with [facility]  Frannie Awad  11/3/1980  Primary Care Provider:  Soheila Vieira    6/25/2020  Accompanied visit: No      46year old female patient being seen for: Multiple Sclerosis Glatiramer Acetate (GLATOPA) 40 MG/ML Subcutaneous Solution Prefilled Syringe, Inject 40 mg into the skin 3 (three) times a week., Disp: 12 Syringe, Rfl: 11  •  modafinil (PROVIGIL) 200 MG Oral Tab, Take 1 tab po qam, Disp: 30 tablet, Rfl: 2  •  baclofen 1 to above. Includes explanation of tests as necessary. Return in about 4 months (around 10/25/2020).       Patient understands that if needed, based on condition and or test results, follow up will be readjusted      Joycelyn Mcneill PA-C  [facility]

## 2020-06-25 NOTE — TELEPHONE ENCOUNTER
Prior authorization requested for 4 days of IV solumedrol in [facility]. Patient to receive 1000 mg times 2 days followed by 500 mg times 2 days. Patient to present to office for solumedrol treatments next week.   1000 mg on Monday and Tuesday,

## 2020-06-25 NOTE — TELEPHONE ENCOUNTER
[address] dillan Lamar Ettatajustine and spoke with Nancy. Codes [id number] and [id number] are valid and billable no authorization or predetermination required. Call reference #[id number].  Time on call 33:01

## 2020-06-29 ENCOUNTER — NURSE ONLY (OUTPATIENT)
Dept: NEUROLOGY | Facility: CLINIC | Age: 40
End: 2020-06-29
Payer: COMMERCIAL

## 2020-06-29 VITALS
RESPIRATION RATE: 18 BRPM | TEMPERATURE: 98 F | SYSTOLIC BLOOD PRESSURE: 126 MMHG | DIASTOLIC BLOOD PRESSURE: 62 MMHG | HEART RATE: 84 BPM

## 2020-06-29 DIAGNOSIS — G35 MULTIPLE SCLEROSIS (HCC): Primary | ICD-10-CM

## 2020-06-29 PROCEDURE — 96365 THER/PROPH/DIAG IV INF INIT: CPT | Performed by: OTHER

## 2020-06-29 RX ORDER — METHYLPREDNISOLONE SODIUM SUCCINATE 500 MG/1
1000 INJECTION, POWDER, FOR SOLUTION INTRAMUSCULAR; INTRAVENOUS DAILY
Status: COMPLETED | OUTPATIENT
Start: 2020-06-29 | End: 2020-06-30

## 2020-06-29 RX ADMIN — METHYLPREDNISOLONE SODIUM SUCCINATE 1000 MG: 500 INJECTION, POWDER, FOR SOLUTION INTRAMUSCULAR; INTRAVENOUS at 09:16:00

## 2020-06-29 NOTE — PROGRESS NOTES
IV started per Edy Asher RN into [facility] and Solu-Medrol infused over 20 minutes without difficulty. IV discontinued per RN. Patient tolerated infusion well with no complications.

## 2020-06-30 ENCOUNTER — NURSE ONLY (OUTPATIENT)
Dept: NEUROLOGY | Facility: CLINIC | Age: 40
End: 2020-06-30
Payer: COMMERCIAL

## 2020-06-30 VITALS — HEART RATE: 104 BPM | RESPIRATION RATE: 20 BRPM | SYSTOLIC BLOOD PRESSURE: 130 MMHG | DIASTOLIC BLOOD PRESSURE: 62 MMHG

## 2020-06-30 DIAGNOSIS — G35 MULTIPLE SCLEROSIS (HCC): ICD-10-CM

## 2020-06-30 PROCEDURE — 96365 THER/PROPH/DIAG IV INF INIT: CPT | Performed by: OTHER

## 2020-06-30 RX ADMIN — METHYLPREDNISOLONE SODIUM SUCCINATE 1000 MG: 500 INJECTION, POWDER, FOR SOLUTION INTRAMUSCULAR; INTRAVENOUS at 09:21:00

## 2020-06-30 NOTE — PROGRESS NOTES
IV started per Franco Tidwell RN into right antecubital site and Solu-Medrol infused over 20 minutes without difficulty. IV discontinued per RN. Patient tolerated infusion well with no complications. Discussed current IV therapy orders and oral steroid taper.   P

## 2020-07-01 ENCOUNTER — NURSE ONLY (OUTPATIENT)
Dept: NEUROLOGY | Facility: CLINIC | Age: 40
End: 2020-07-01
Payer: COMMERCIAL

## 2020-07-01 VITALS
RESPIRATION RATE: 18 BRPM | TEMPERATURE: 100 F | HEART RATE: 100 BPM | DIASTOLIC BLOOD PRESSURE: 62 MMHG | SYSTOLIC BLOOD PRESSURE: 128 MMHG

## 2020-07-01 DIAGNOSIS — G35 MULTIPLE SCLEROSIS (HCC): ICD-10-CM

## 2020-07-01 PROCEDURE — 96365 THER/PROPH/DIAG IV INF INIT: CPT | Performed by: OTHER

## 2020-07-01 RX ORDER — METHYLPREDNISOLONE SODIUM SUCCINATE 500 MG/1
500 INJECTION, POWDER, FOR SOLUTION INTRAMUSCULAR; INTRAVENOUS DAILY
Status: COMPLETED | OUTPATIENT
Start: 2020-07-01 | End: 2020-07-02

## 2020-07-01 RX ADMIN — METHYLPREDNISOLONE SODIUM SUCCINATE 500 MG: 500 INJECTION, POWDER, FOR SOLUTION INTRAMUSCULAR; INTRAVENOUS at 09:44:00

## 2020-07-01 NOTE — PROGRESS NOTES
IV started per Emily Bland RN into right antecubital site and Solu-Medrol infused over 20 minutes without difficulty. IV discontinued per RN. Patient tolerated infusion well with no complications. Patient to have one additional infusion tomorrow.

## 2020-07-02 ENCOUNTER — NURSE ONLY (OUTPATIENT)
Dept: NEUROLOGY | Facility: CLINIC | Age: 40
End: 2020-07-02
Payer: COMMERCIAL

## 2020-07-02 VITALS — RESPIRATION RATE: 18 BRPM | HEART RATE: 88 BPM | SYSTOLIC BLOOD PRESSURE: 140 MMHG | DIASTOLIC BLOOD PRESSURE: 70 MMHG

## 2020-07-02 DIAGNOSIS — F19.982 INSOMNIA DUE TO DRUG (HCC): ICD-10-CM

## 2020-07-02 DIAGNOSIS — G35 MULTIPLE SCLEROSIS (HCC): ICD-10-CM

## 2020-07-02 PROCEDURE — 96365 THER/PROPH/DIAG IV INF INIT: CPT | Performed by: OTHER

## 2020-07-02 RX ORDER — ZOLPIDEM TARTRATE 10 MG/1
10 TABLET ORAL NIGHTLY PRN
Qty: 30 TABLET | Refills: 0 | Status: SHIPPED | OUTPATIENT
Start: 2020-07-02 | End: 2020-12-14

## 2020-07-02 RX ADMIN — METHYLPREDNISOLONE SODIUM SUCCINATE 500 MG: 500 INJECTION, POWDER, FOR SOLUTION INTRAMUSCULAR; INTRAVENOUS at 09:20:00

## 2020-07-02 NOTE — PROGRESS NOTES
IV started per Shearon Blizzard RN and Solu-Medrol infused over 20 minutes without difficulty. IV discontinued per RN. Patient tolerated infusion well with no complications. Explained oral taper again to patient. He is to start the 100 mg dosage tomorrow.   Voiced

## 2020-07-27 ENCOUNTER — TELEPHONE (OUTPATIENT)
Dept: NEUROLOGY | Facility: CLINIC | Age: 40
End: 2020-07-27

## 2020-07-27 DIAGNOSIS — G35 MULTIPLE SCLEROSIS (HCC): Primary | ICD-10-CM

## 2020-07-27 DIAGNOSIS — G04.91 MYELITIS (HCC): ICD-10-CM

## 2020-07-27 DIAGNOSIS — G35 MULTIPLE SCLEROSIS (HCC): ICD-10-CM

## 2020-07-27 RX ORDER — TRAMADOL HYDROCHLORIDE 50 MG/1
50 TABLET ORAL 2 TIMES DAILY PRN
Qty: 60 TABLET | Refills: 1 | Status: SHIPPED | OUTPATIENT
Start: 2020-07-27 | End: 2020-09-21

## 2020-07-27 NOTE — TELEPHONE ENCOUNTER
[facility] MRI[facility] were [facility] they did authorization. I called patient to see if he was still going to [facility]. Had to leave a message.

## 2020-07-27 NOTE — TELEPHONE ENCOUNTER
[facility] going [facility] from June please have enter an order as Zora were entered as [facility] and we need a new one since he is not going to [facility].

## 2020-07-27 NOTE — TELEPHONE ENCOUNTER
Medication: Tramadol    Date of last refill: 6/3/20 (#60/1)  [place] 2/0/91 (#47)    Last office visit: 6/25/20 with Angie Moreno  Due back to clinic per last office note:  4 months  Date next office visit scheduled:  No future

## 2020-07-27 NOTE — TELEPHONE ENCOUNTER
Pt unable to schedule MRIs ordered by Ileana Townsend in June. He has new ins, [employer], effective 7/1/20. Please initiate PA for tests.

## 2020-07-27 NOTE — TELEPHONE ENCOUNTER
Pt calling back stating he'd like to schedule @ [facility], pt will await call back once auth received

## 2020-08-07 DIAGNOSIS — G35 MULTIPLE SCLEROSIS (HCC): ICD-10-CM

## 2020-08-07 RX ORDER — BACLOFEN 10 MG/1
TABLET ORAL
Qty: 60 TABLET | Refills: 5 | Status: SHIPPED | OUTPATIENT
Start: 2020-08-07 | End: 2021-11-17

## 2020-08-07 NOTE — TELEPHONE ENCOUNTER
Medication: Baclofen 10 mg     Date of last refill: 05/30/2019 with 5 addt refills  [place]      Last office visit: 6/25/20 with Walter Boland  Due back to clinic per last office note:  4 months  Date next office visit scheduled

## 2020-08-22 ENCOUNTER — HOSPITAL ENCOUNTER (OUTPATIENT)
Dept: MRI IMAGING | Age: 40
Discharge: HOME | End: 2020-08-22
Attending: Other
Payer: COMMERCIAL

## 2020-08-22 DIAGNOSIS — G35 MULTIPLE SCLEROSIS (HCC): ICD-10-CM

## 2020-08-22 PROCEDURE — 70551 MRI BRAIN STEM W/O DYE: CPT | Performed by: OTHER

## 2020-08-22 NOTE — IMAGING NOTE
The patient could not finish the exam, he stated that he could not breath due to the air conditioning.

## 2020-08-24 ENCOUNTER — TELEPHONE (OUTPATIENT)
Dept: NEUROLOGY | Facility: CLINIC | Age: 40
End: 2020-08-24

## 2020-08-24 NOTE — TELEPHONE ENCOUNTER
Pt needs letter for work confirming he is being treated for MS and how this will effect his job. Omar Carr   He has already informed his employer but they are requesting a note from MD.

## 2020-08-24 NOTE — TELEPHONE ENCOUNTER
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 He has no concerns about being able to perform his job. Similar letter was written (see Epic). OK to send to Bora Caballero. Letter pended for MD review and signature if appropriate.

## 2020-09-21 DIAGNOSIS — G35 MULTIPLE SCLEROSIS (HCC): ICD-10-CM

## 2020-09-21 DIAGNOSIS — G04.91 MYELITIS (HCC): ICD-10-CM

## 2020-09-22 DIAGNOSIS — G04.91 MYELITIS (HCC): ICD-10-CM

## 2020-09-22 DIAGNOSIS — G35 MULTIPLE SCLEROSIS (HCC): ICD-10-CM

## 2020-09-22 RX ORDER — TRAMADOL HYDROCHLORIDE 50 MG/1
50 TABLET ORAL 2 TIMES DAILY PRN
Qty: 60 TABLET | Refills: 5 | Status: SHIPPED | OUTPATIENT
Start: 2020-09-22 | End: 2021-02-08

## 2020-09-22 NOTE — TELEPHONE ENCOUNTER
Medication: Tramadol 50 mg     Date of last refill: 07/27/20 with 1 addt refills  [place]      Last office visit: 6/25/20 with Joycelyn  Due back to clinic per last office note:  4 months  Date next office visit scheduled:

## 2020-09-23 RX ORDER — TRAMADOL HYDROCHLORIDE 50 MG/1
TABLET ORAL
Qty: 60 TABLET | Refills: 0 | OUTPATIENT
Start: 2020-09-23

## 2020-11-07 ENCOUNTER — HOSPITAL ENCOUNTER (OUTPATIENT)
Dept: MRI IMAGING | Facility: HOSPITAL | Age: 40
Discharge: HOME | End: 2020-11-07
Attending: Other
Payer: COMMERCIAL

## 2020-11-07 DIAGNOSIS — G35 MULTIPLE SCLEROSIS (HCC): ICD-10-CM

## 2020-11-07 PROCEDURE — 72157 MRI CHEST SPINE W/O & W/DYE: CPT | Performed by: OTHER

## 2020-11-07 PROCEDURE — A9575 INJ GADOTERATE MEGLUMI 0.1ML: HCPCS

## 2020-11-07 PROCEDURE — 72156 MRI NECK SPINE W/O & W/DYE: CPT | Performed by: OTHER

## 2020-11-11 ENCOUNTER — OFFICE VISIT (OUTPATIENT)
Dept: NEUROLOGY | Facility: CLINIC | Age: 40
End: 2020-11-11
Payer: COMMERCIAL

## 2020-11-11 VITALS — RESPIRATION RATE: 16 BRPM | HEART RATE: 92 BPM | SYSTOLIC BLOOD PRESSURE: 114 MMHG | DIASTOLIC BLOOD PRESSURE: 62 MMHG

## 2020-11-11 DIAGNOSIS — G35 MULTIPLE SCLEROSIS (HCC): Primary | ICD-10-CM

## 2020-11-11 PROCEDURE — 99214 OFFICE O/P EST MOD 30 MIN: CPT | Performed by: PHYSICIAN ASSISTANT

## 2020-11-11 PROCEDURE — 99072 ADDL SUPL MATRL&STAF TM PHE: CPT | Performed by: PHYSICIAN ASSISTANT

## 2020-11-11 PROCEDURE — 3078F DIAST BP <80 MM HG: CPT | Performed by: PHYSICIAN ASSISTANT

## 2020-11-11 PROCEDURE — 3074F SYST BP LT 130 MM HG: CPT | Performed by: PHYSICIAN ASSISTANT

## 2020-11-11 NOTE — PROGRESS NOTES
[facility] with [facility]  Bonnie Huynh  11/3/1980  Primary Care Provider:  Jeane Figueroa    11/11/2020  Accompanied visit: No      45year old male patient being seen for: Multiple Sclerosis into the skin 3 (three) times a week., Disp: 12 Syringe, Rfl: 11  •  Multiple Vitamin (ONE-DAILY MULTI VITAMINS) Oral Tab, Take 1 tablet by mouth daily. , Disp: , Rfl:   •  modafinil (PROVIGIL) 200 MG Oral Tab, Take 1 tab po qam (Patient not taking: Reporte

## 2020-11-12 ENCOUNTER — TELEPHONE (OUTPATIENT)
Dept: NEUROLOGY | Facility: CLINIC | Age: 40
End: 2020-11-12

## 2020-11-12 DIAGNOSIS — G35 MULTIPLE SCLEROSIS (HCC): Primary | ICD-10-CM

## 2020-11-19 NOTE — TELEPHONE ENCOUNTER
RN spoke to the patient and confirmed that he wants to start with the Kesimpta medication. Pt is going to come into office to sign the start form. RN entered labs for patient.

## 2020-11-20 ENCOUNTER — LAB ENCOUNTER (OUTPATIENT)
Dept: LAB | Age: 40
End: 2020-11-20
Attending: Other
Payer: COMMERCIAL

## 2020-11-20 DIAGNOSIS — G35 MULTIPLE SCLEROSIS (HCC): ICD-10-CM

## 2020-11-20 DIAGNOSIS — R53.83 OTHER FATIGUE: ICD-10-CM

## 2020-11-20 PROCEDURE — 80053 COMPREHEN METABOLIC PANEL: CPT

## 2020-11-20 PROCEDURE — 82306 VITAMIN D 25 HYDROXY: CPT

## 2020-11-20 PROCEDURE — 82607 VITAMIN B-12: CPT

## 2020-11-20 PROCEDURE — 36415 COLL VENOUS BLD VENIPUNCTURE: CPT

## 2020-11-20 PROCEDURE — 86706 HEP B SURFACE ANTIBODY: CPT

## 2020-11-20 PROCEDURE — 82784 ASSAY IGA/IGD/IGG/IGM EACH: CPT

## 2020-11-20 PROCEDURE — 86704 HEP B CORE ANTIBODY TOTAL: CPT

## 2020-11-20 PROCEDURE — 84443 ASSAY THYROID STIM HORMONE: CPT

## 2020-11-20 PROCEDURE — 85025 COMPLETE CBC W/AUTO DIFF WBC: CPT

## 2020-11-20 PROCEDURE — 87340 HEPATITIS B SURFACE AG IA: CPT

## 2020-12-14 ENCOUNTER — TELEPHONE (OUTPATIENT)
Dept: NEUROLOGY | Facility: CLINIC | Age: 40
End: 2020-12-14

## 2020-12-14 ENCOUNTER — OFFICE VISIT (OUTPATIENT)
Dept: NEUROLOGY | Facility: CLINIC | Age: 40
End: 2020-12-14
Payer: COMMERCIAL

## 2020-12-14 VITALS
WEIGHT: 186 LBS | HEART RATE: 69 BPM | BODY MASS INDEX: 26.04 KG/M2 | SYSTOLIC BLOOD PRESSURE: 118 MMHG | DIASTOLIC BLOOD PRESSURE: 80 MMHG | HEIGHT: 71 IN | RESPIRATION RATE: 16 BRPM

## 2020-12-14 DIAGNOSIS — R29.898 WEAKNESS OF LEFT LOWER EXTREMITY: ICD-10-CM

## 2020-12-14 DIAGNOSIS — G35 MULTIPLE SCLEROSIS (HCC): Primary | ICD-10-CM

## 2020-12-14 DIAGNOSIS — F19.982 INSOMNIA DUE TO DRUG (HCC): ICD-10-CM

## 2020-12-14 PROCEDURE — 3074F SYST BP LT 130 MM HG: CPT | Performed by: PHYSICIAN ASSISTANT

## 2020-12-14 PROCEDURE — 3008F BODY MASS INDEX DOCD: CPT | Performed by: PHYSICIAN ASSISTANT

## 2020-12-14 PROCEDURE — 96365 THER/PROPH/DIAG IV INF INIT: CPT | Performed by: PHYSICIAN ASSISTANT

## 2020-12-14 PROCEDURE — 3079F DIAST BP 80-89 MM HG: CPT | Performed by: PHYSICIAN ASSISTANT

## 2020-12-14 RX ORDER — METHYLPREDNISOLONE SODIUM SUCCINATE 500 MG/1
500 INJECTION, POWDER, FOR SOLUTION INTRAMUSCULAR; INTRAVENOUS DAILY
Status: COMPLETED | OUTPATIENT
Start: 2020-12-14 | End: 2020-12-16

## 2020-12-14 RX ORDER — PREDNISONE 20 MG/1
TABLET ORAL
Qty: 30 TABLET | Refills: 0 | Status: SHIPPED | OUTPATIENT
Start: 2020-12-14 | End: 2021-03-08

## 2020-12-14 RX ORDER — ZOLPIDEM TARTRATE 10 MG/1
10 TABLET ORAL NIGHTLY PRN
Qty: 30 TABLET | Refills: 0 | Status: SHIPPED | OUTPATIENT
Start: 2020-12-14 | End: 2021-01-12

## 2020-12-14 RX ADMIN — METHYLPREDNISOLONE SODIUM SUCCINATE 500 MG: 500 INJECTION, POWDER, FOR SOLUTION INTRAMUSCULAR; INTRAVENOUS at 15:10:00

## 2020-12-14 NOTE — PROGRESS NOTES
[facility] with [facility]  Treva Jain  11/3/1980  Primary Care Provider:  Mechelle Jin    12/14/2020  Accompanied visit: No      45year old male patient being seen for: Multiple Sclerosis Allergies:  No Known Allergies         EXAM:  /80 (BP Location: Right arm, Patient Position: Sitting, Cuff Size: adult)   Pulse 69   Resp 16   Ht 71\"   Wt 186 lb (84.4 kg)   BMI 25.94 kg/m²   Looks stated age  Pink conjunctiva anicteric sclerae,

## 2020-12-14 NOTE — PROGRESS NOTES
IV started per [facility] and Solu-Medrol infused over 20 minutes without difficulty. IV discontinued per RN. Patient tolerated infusion well with no complications.

## 2020-12-15 ENCOUNTER — NURSE ONLY (OUTPATIENT)
Dept: NEUROLOGY | Facility: CLINIC | Age: 40
End: 2020-12-15
Payer: COMMERCIAL

## 2020-12-15 VITALS — RESPIRATION RATE: 22 BRPM | HEART RATE: 108 BPM | DIASTOLIC BLOOD PRESSURE: 70 MMHG | SYSTOLIC BLOOD PRESSURE: 138 MMHG

## 2020-12-15 DIAGNOSIS — G35 MULTIPLE SCLEROSIS (HCC): ICD-10-CM

## 2020-12-15 PROCEDURE — 3075F SYST BP GE 130 - 139MM HG: CPT | Performed by: OTHER

## 2020-12-15 PROCEDURE — 3078F DIAST BP <80 MM HG: CPT | Performed by: OTHER

## 2020-12-15 PROCEDURE — 96365 THER/PROPH/DIAG IV INF INIT: CPT | Performed by: PHYSICIAN ASSISTANT

## 2020-12-15 RX ADMIN — METHYLPREDNISOLONE SODIUM SUCCINATE 500 MG: 500 INJECTION, POWDER, FOR SOLUTION INTRAMUSCULAR; INTRAVENOUS at 10:07:00

## 2020-12-15 NOTE — PROGRESS NOTES
IV started per Alma Soto RN into right antecubital site and Solu-Medrol infused over 20 minutes without difficulty. IV discontinued per RN. Patient tolerated infusion well with no complications.

## 2020-12-16 ENCOUNTER — NURSE ONLY (OUTPATIENT)
Dept: NEUROLOGY | Facility: CLINIC | Age: 40
End: 2020-12-16
Payer: COMMERCIAL

## 2020-12-16 VITALS
WEIGHT: 186 LBS | SYSTOLIC BLOOD PRESSURE: 124 MMHG | BODY MASS INDEX: 27.55 KG/M2 | DIASTOLIC BLOOD PRESSURE: 70 MMHG | RESPIRATION RATE: 18 BRPM | HEIGHT: 69 IN | HEART RATE: 93 BPM

## 2020-12-16 DIAGNOSIS — G35 MULTIPLE SCLEROSIS (HCC): ICD-10-CM

## 2020-12-16 PROCEDURE — 3074F SYST BP LT 130 MM HG: CPT | Performed by: OTHER

## 2020-12-16 PROCEDURE — 3078F DIAST BP <80 MM HG: CPT | Performed by: OTHER

## 2020-12-16 PROCEDURE — 3008F BODY MASS INDEX DOCD: CPT | Performed by: OTHER

## 2020-12-16 NOTE — PROGRESS NOTES
IV started per [employer] and Solu-Medrol infused over 20 minutes without difficulty. IV discontinued per RN. Patient tolerated infusion well with no complications.

## 2021-01-05 ENCOUNTER — TELEPHONE (OUTPATIENT)
Dept: NEUROLOGY | Facility: CLINIC | Age: 41
End: 2021-01-05

## 2021-01-05 NOTE — TELEPHONE ENCOUNTER
Pt tested positive for COVID 19 on 12/26/20. He finished copaxone last week. Has not started kesimpta yet. Please call with directions on how to proceed.

## 2021-01-05 NOTE — TELEPHONE ENCOUNTER
Discussed case with provider regarding starting new medication. Recommendation is to wait one month before proceeding with new medication.

## 2021-01-06 NOTE — TELEPHONE ENCOUNTER
Spoke with patient and advised to start Sherial File on February 1st. Patient verbalized understanding.

## 2021-01-11 ENCOUNTER — TELEPHONE (OUTPATIENT)
Dept: NEUROLOGY | Facility: CLINIC | Age: 41
End: 2021-01-11

## 2021-01-11 DIAGNOSIS — G35 MULTIPLE SCLEROSIS (HCC): Primary | ICD-10-CM

## 2021-01-11 RX ORDER — PREDNISONE 20 MG/1
TABLET ORAL
Qty: 30 TABLET | Refills: 0 | Status: SHIPPED | OUTPATIENT
Start: 2021-01-11 | End: 2021-03-08

## 2021-01-11 NOTE — TELEPHONE ENCOUNTER
Call pt to discuss leg issues and how to proceed with treatment.   See previous notes about future medications

## 2021-01-11 NOTE — TELEPHONE ENCOUNTER
RN spoke to patient and he informed the RN he is having increased left leg weakness. Pt became Covid positive on Dec. 26th. RN spoke to Dr. Valentino Alvarez and he verbally ordered oral prednisone 2 week taper. And to start Narvis Bonds around mid February.  AMISH maxwell

## 2021-01-12 DIAGNOSIS — F19.982 INSOMNIA DUE TO DRUG (HCC): ICD-10-CM

## 2021-01-12 NOTE — TELEPHONE ENCOUNTER
Medication: Zolpidem 10 mg    Date of last refill: 12/14/20  [place]     Last office visit: 11/1/20 and 12/14/20 nurse visit  Due back to clinic per last office note:  RTN in 4 months  Date next office visit scheduled:

## 2021-01-13 DIAGNOSIS — F19.982 INSOMNIA DUE TO DRUG (HCC): ICD-10-CM

## 2021-01-13 RX ORDER — ZOLPIDEM TARTRATE 10 MG/1
10 TABLET ORAL NIGHTLY PRN
Qty: 30 TABLET | Refills: 0 | Status: SHIPPED | OUTPATIENT
Start: 2021-01-13 | End: 2021-06-10

## 2021-01-13 RX ORDER — ZOLPIDEM TARTRATE 10 MG/1
10 TABLET ORAL NIGHTLY PRN
Qty: 30 TABLET | Refills: 0 | OUTPATIENT
Start: 2021-01-13

## 2021-01-13 NOTE — TELEPHONE ENCOUNTER
Medication: zolpidem tartrate 10 mg oral tab  Take 1 tablet (10 mg total) by mouth nightly as needed for sleep    Date of last refill: 12/14/2020 (#30/0)  [place]    Last office visit: 12/14/2020   Due back to clinic

## 2021-02-08 DIAGNOSIS — G35 MULTIPLE SCLEROSIS (HCC): ICD-10-CM

## 2021-02-08 DIAGNOSIS — G04.91 MYELITIS (HCC): ICD-10-CM

## 2021-02-09 DIAGNOSIS — G04.91 MYELITIS (HCC): ICD-10-CM

## 2021-02-09 DIAGNOSIS — G35 MULTIPLE SCLEROSIS (HCC): ICD-10-CM

## 2021-02-09 RX ORDER — TRAMADOL HYDROCHLORIDE 50 MG/1
50 TABLET ORAL 2 TIMES DAILY PRN
Qty: 60 TABLET | Refills: 5 | Status: SHIPPED | OUTPATIENT
Start: 2021-02-09 | End: 2021-07-19

## 2021-02-09 RX ORDER — TRAMADOL HYDROCHLORIDE 50 MG/1
50 TABLET ORAL 2 TIMES DAILY PRN
Qty: 60 TABLET | Refills: 5 | Status: CANCELLED | OUTPATIENT
Start: 2021-02-09

## 2021-02-09 NOTE — TELEPHONE ENCOUNTER
Medication: Tramadol 50 mg    Date of last refill: 09/22/20 with 5 addt refills  [place]16[place]20     Last office visit: 12/14/2020           Due back to clinic per last office note: na  Date next office visit scheduled:

## 2021-03-04 NOTE — MR AVS SNAPSHOT
[facility] [address] [facility] [address] [phone]               Thank you for choosing us for your health care visit with [facility].   We are glad to serve you and happy to provide you with this summary of your v Mother reported MRI scheduled for 3/8 was over $3,000. RAKESH at [facility] reported PA was not file and approval/ denial could possibly be reported until morning of MRI. Mother aware of above information- provided family with [facility] # ([phone]) to follow up with PA outcome on Monday morning. If RAKESH reports no approval on file or copay still too high- family will call us before moving forward with MRI and we would have  advise next steps. Take 1 tablet (10 mg total) by mouth nightly as needed for Sleep.    Commonly known as:  AMBIEN                   Medications Administered in the Office Today     MethylPREDNISolone Sodium Succ (Solu-MEDROL) 500 MG injection 500 mg                    [employer]

## 2021-03-08 ENCOUNTER — OFFICE VISIT (OUTPATIENT)
Dept: NEUROLOGY | Facility: CLINIC | Age: 41
End: 2021-03-08
Payer: COMMERCIAL

## 2021-03-08 VITALS
HEIGHT: 69 IN | RESPIRATION RATE: 16 BRPM | BODY MASS INDEX: 27.55 KG/M2 | HEART RATE: 74 BPM | DIASTOLIC BLOOD PRESSURE: 74 MMHG | WEIGHT: 186 LBS | SYSTOLIC BLOOD PRESSURE: 126 MMHG

## 2021-03-08 DIAGNOSIS — G35 MULTIPLE SCLEROSIS (HCC): Primary | ICD-10-CM

## 2021-03-08 PROCEDURE — 3078F DIAST BP <80 MM HG: CPT | Performed by: PHYSICIAN ASSISTANT

## 2021-03-08 PROCEDURE — 3074F SYST BP LT 130 MM HG: CPT | Performed by: PHYSICIAN ASSISTANT

## 2021-03-08 PROCEDURE — 3008F BODY MASS INDEX DOCD: CPT | Performed by: PHYSICIAN ASSISTANT

## 2021-03-08 PROCEDURE — 99214 OFFICE O/P EST MOD 30 MIN: CPT | Performed by: PHYSICIAN ASSISTANT

## 2021-03-08 RX ORDER — OFATUMUMAB 20 MG/.4ML
INJECTION, SOLUTION SUBCUTANEOUS
COMMUNITY
End: 2021-10-19

## 2021-03-08 NOTE — PROGRESS NOTES
[facility] with [facility]  Alicia Brantley  11/3/1980  Primary Care Provider:  Sammi Wick    3/8/2021  Accompanied visit: No    Previous visit and existing record notes reviewed in preparation Subcutaneous Solution Auto-injector, Inject into the skin., Disp: , Rfl:   •  traMADol HCl 50 MG Oral Tab, Take 1 tablet (50 mg total) by mouth 2 (two) times daily as needed for Pain., Disp: 60 tablet, Rfl: 5  •  Zolpidem Tartrate 10 MG Oral Tab, Take 1 ta 7/8/2021).       Patient understands that if needed, based on condition and or test results, follow up will be readjusted      NICK English [facility]  3/8/2021, Time completed 11:53 AM

## 2021-03-11 ENCOUNTER — TELEPHONE (OUTPATIENT)
Dept: NEUROLOGY | Facility: CLINIC | Age: 41
End: 2021-03-11

## 2021-03-11 NOTE — TELEPHONE ENCOUNTER
Informed pt his Verification of Disability (Employment) form is ready at  for him to . He will need to sign consent form. We will make copies for our records.   Pt advised he will stop by tomorrow, reminded him office closes at 1pm.

## 2021-04-07 NOTE — TELEPHONE ENCOUNTER
Per Epic review, patient has started medication Feb 1, 2021. Medication updated in Epic and start up forms sent to scanning.     Medication approved 11/24/2020 - 11/24/2021

## 2021-06-09 ENCOUNTER — TELEPHONE (OUTPATIENT)
Dept: NEUROLOGY | Facility: CLINIC | Age: 41
End: 2021-06-09

## 2021-06-09 NOTE — TELEPHONE ENCOUNTER
S: Calling with possible symptoms of MS relapse, starting 2 days ago  B: MS patient, currently using Janis Ram, next dose in five days for DMT. Last seen 3/8/2021 with notes:   Discussion plus Diagnostics & Treatment Orders:     Multiple Sclerosis- stable.

## 2021-06-10 ENCOUNTER — TELEPHONE (OUTPATIENT)
Dept: NEUROLOGY | Facility: CLINIC | Age: 41
End: 2021-06-10

## 2021-06-10 ENCOUNTER — NURSE ONLY (OUTPATIENT)
Dept: NEUROLOGY | Facility: CLINIC | Age: 41
End: 2021-06-10
Payer: COMMERCIAL

## 2021-06-10 VITALS — RESPIRATION RATE: 18 BRPM | HEART RATE: 80 BPM | SYSTOLIC BLOOD PRESSURE: 100 MMHG | DIASTOLIC BLOOD PRESSURE: 70 MMHG

## 2021-06-10 DIAGNOSIS — G35 MULTIPLE SCLEROSIS (HCC): Primary | ICD-10-CM

## 2021-06-10 DIAGNOSIS — F19.982 INSOMNIA DUE TO DRUG (HCC): ICD-10-CM

## 2021-06-10 PROCEDURE — 3078F DIAST BP <80 MM HG: CPT | Performed by: OTHER

## 2021-06-10 PROCEDURE — 96365 THER/PROPH/DIAG IV INF INIT: CPT | Performed by: OTHER

## 2021-06-10 PROCEDURE — 3074F SYST BP LT 130 MM HG: CPT | Performed by: OTHER

## 2021-06-10 RX ORDER — PREDNISONE 20 MG/1
TABLET ORAL
Qty: 30 TABLET | Refills: 0 | Status: SHIPPED | OUTPATIENT
Start: 2021-06-13 | End: 2021-08-09 | Stop reason: ALTCHOICE

## 2021-06-10 RX ORDER — METHYLPREDNISOLONE SODIUM SUCCINATE 500 MG/1
500 INJECTION, POWDER, FOR SOLUTION INTRAMUSCULAR; INTRAVENOUS DAILY
Status: COMPLETED | OUTPATIENT
Start: 2021-06-10 | End: 2021-06-11

## 2021-06-10 RX ORDER — ZOLPIDEM TARTRATE 10 MG/1
10 TABLET ORAL NIGHTLY PRN
Qty: 30 TABLET | Refills: 0 | Status: SHIPPED | OUTPATIENT
Start: 2021-06-10 | End: 2022-01-20

## 2021-06-10 RX ADMIN — METHYLPREDNISOLONE SODIUM SUCCINATE 500 MG: 500 INJECTION, POWDER, FOR SOLUTION INTRAMUSCULAR; INTRAVENOUS at 13:20:00

## 2021-06-10 NOTE — TELEPHONE ENCOUNTER
PA needed for Solumedrol 500mg x 3 days. 2 days in [facility] and 1 day at [facility].

## 2021-06-10 NOTE — TELEPHONE ENCOUNTER
Patient presented to office for steroid infusion. Requesting a refill on Ambien to assist with sleep during treatment. Medication pended for provider signature. Prednisone taper sent to pharmacy.

## 2021-06-10 NOTE — TELEPHONE ENCOUNTER
RN LM with patient informing him he can come today at 1:00pm today for his Solumedrol. RN advised to call back and let the office notes know if that works for him.

## 2021-06-10 NOTE — PROGRESS NOTES
IV started per Autumn Marsh RN into right antecubital site and Solu-Medrol infused over 20 minutes without difficulty. IV discontinued per RN. Patient tolerated infusion well with no complications.

## 2021-06-10 NOTE — TELEPHONE ENCOUNTER
Prior authorization request completed for: Solumedrol    Authorization #No Prior Authorization/Nor Pre Determination is Required   Spoke with Joanne Blount at [facility] [phone]  Reference #:[id number]   Time:29:05    Authorization dates: NA   CPT codes ap

## 2021-06-11 ENCOUNTER — NURSE ONLY (OUTPATIENT)
Dept: NEUROLOGY | Facility: CLINIC | Age: 41
End: 2021-06-11
Payer: COMMERCIAL

## 2021-06-11 DIAGNOSIS — G35 MULTIPLE SCLEROSIS (HCC): ICD-10-CM

## 2021-06-11 PROCEDURE — 96365 THER/PROPH/DIAG IV INF INIT: CPT | Performed by: OTHER

## 2021-06-11 RX ADMIN — METHYLPREDNISOLONE SODIUM SUCCINATE 500 MG: 500 INJECTION, POWDER, FOR SOLUTION INTRAMUSCULAR; INTRAVENOUS at 12:24:00

## 2021-06-11 NOTE — PROGRESS NOTES
IV started per Alyssa Mclean RN and Solu-Medrol infused over 20 minutes without difficulty. IV discontinued per RN. Patient tolerated infusion well with no complications.

## 2021-06-12 ENCOUNTER — TELEPHONE (OUTPATIENT)
Dept: HEMATOLOGY/ONCOLOGY | Facility: HOSPITAL | Age: 41
End: 2021-06-12

## 2021-07-19 DIAGNOSIS — G35 MULTIPLE SCLEROSIS (HCC): ICD-10-CM

## 2021-07-19 DIAGNOSIS — G04.91 MYELITIS (HCC): ICD-10-CM

## 2021-07-19 RX ORDER — TRAMADOL HYDROCHLORIDE 50 MG/1
50 TABLET ORAL 2 TIMES DAILY PRN
Qty: 60 TABLET | Refills: 5 | Status: SHIPPED | OUTPATIENT
Start: 2021-07-19 | End: 2021-12-30

## 2021-07-19 NOTE — TELEPHONE ENCOUNTER
Medication: TRAMADOL HCI 50 MG Oral Tab    Date of last refill: 2/9/2021 (#60/5)  [place] 0/10/9437 early refill per pharmacy    Last office visit: 3/8/2021  Due back to clinic per last office note:  4 months  Date next

## 2021-08-09 ENCOUNTER — OFFICE VISIT (OUTPATIENT)
Dept: NEUROLOGY | Facility: CLINIC | Age: 41
End: 2021-08-09
Payer: COMMERCIAL

## 2021-08-09 VITALS
WEIGHT: 177 LBS | HEIGHT: 69 IN | DIASTOLIC BLOOD PRESSURE: 70 MMHG | HEART RATE: 88 BPM | RESPIRATION RATE: 16 BRPM | SYSTOLIC BLOOD PRESSURE: 122 MMHG | BODY MASS INDEX: 26.22 KG/M2

## 2021-08-09 DIAGNOSIS — G35 MULTIPLE SCLEROSIS (HCC): Primary | ICD-10-CM

## 2021-08-09 DIAGNOSIS — R29.898 WEAKNESS OF LEFT LOWER EXTREMITY: ICD-10-CM

## 2021-08-09 PROCEDURE — 3074F SYST BP LT 130 MM HG: CPT | Performed by: PHYSICIAN ASSISTANT

## 2021-08-09 PROCEDURE — 3008F BODY MASS INDEX DOCD: CPT | Performed by: PHYSICIAN ASSISTANT

## 2021-08-09 PROCEDURE — 3078F DIAST BP <80 MM HG: CPT | Performed by: PHYSICIAN ASSISTANT

## 2021-08-09 PROCEDURE — 99214 OFFICE O/P EST MOD 30 MIN: CPT | Performed by: PHYSICIAN ASSISTANT

## 2021-08-09 RX ORDER — DALFAMPRIDINE 10 MG/1
TABLET, FILM COATED, EXTENDED RELEASE ORAL
Qty: 60 TABLET | Refills: 5 | Status: SHIPPED | OUTPATIENT
Start: 2021-08-09

## 2021-08-09 NOTE — PROGRESS NOTES
[facility] with [facility]  Anderson Samuel  11/3/1980  Primary Care Provider:  Kathleen Ansari    8/9/2021  Accompanied visit: No  Previous visit and existing record notes reviewed in preparation f Outpatient Medications:   •  Dalfampridine ER (AMPYRA) 10 MG Oral Tablet 12 Hr, Take 1 tab po bid, Disp: 60 tablet, Rfl: 5  •  traMADol HCl 50 MG Oral Tab, Take 1 tablet (50 mg total) by mouth 2 (two) times daily as needed for Pain., Disp: 60 tablet, Rfl: with patient that becoming overheated from the lack of air conditioning may trigger a pseudo exacerbation. He is working on getting it fixed. If new or worsening symptom he was instructed to call.       (X) Discussed potential side effects of any treatment

## 2021-08-11 ENCOUNTER — TELEPHONE (OUTPATIENT)
Dept: NEUROLOGY | Facility: CLINIC | Age: 41
End: 2021-08-11

## 2021-08-11 DIAGNOSIS — G35 MULTIPLE SCLEROSIS (HCC): Primary | ICD-10-CM

## 2021-08-11 NOTE — TELEPHONE ENCOUNTER
Prior authorization initiated through [facility] for patient use of generic Ampyra. Case Britt#: Adelita rivera (Britt: [address]) pending insurance review.

## 2021-08-12 NOTE — TELEPHONE ENCOUNTER
Fax received from [employer] requesting additional information for request for generic Ampyra. Clinical questions answered and faxed to [phone] with receipt confirmation.  Request pending approval.

## 2021-08-17 NOTE — TELEPHONE ENCOUNTER
Spoke with [employer] to determine status of PA. PA is currently denied, pending additional information.      We do not need to appeal the denial, but instead fax denial letter + clinical information to [phone], noting \"additional information\" on cover let

## 2021-08-18 NOTE — TELEPHONE ENCOUNTER
Additional information requested faxed to [phone] with receipt confirmation. Patient EDSS score and walking test results requested. Request pending insurance review.

## 2021-08-19 NOTE — TELEPHONE ENCOUNTER
Fax received from [employer]. Approval received for patient use of generic Ampyra. Approval granted: 8/18/2021 - 2/18/2022.

## 2021-10-19 RX ORDER — OFATUMUMAB 20 MG/.4ML
INJECTION, SOLUTION SUBCUTANEOUS
Qty: 1 EACH | Refills: 11 | Status: SHIPPED | OUTPATIENT
Start: 2021-10-19

## 2021-10-19 NOTE — TELEPHONE ENCOUNTER
Medication: Kesimpta 20 mg    Date of last refill: Historical  [place]     Last office visit: 08/09/21  Due back to clinic per last office note: RTN in 4 months  Date next office visit scheduled:    Future Appointments

## 2021-10-25 ENCOUNTER — TELEPHONE (OUTPATIENT)
Dept: NEUROLOGY | Facility: CLINIC | Age: 41
End: 2021-10-25

## 2021-10-25 NOTE — TELEPHONE ENCOUNTER
Spoke to patient and he will come in for visit tomorrow for evaluation and possible treatment for MS flare-up. He expressed full understanding.

## 2021-10-25 NOTE — TELEPHONE ENCOUNTER
S: Calling with possible symptoms of MS relapse, starting three days ago. B: MS patient, currently using Vargas Lindau for DMT. Next dose 11/12/2021. Last seen 8/9/2021 with notes: Discussion plus Diagnostics & Treatment Orders:     MS- Currently stable.  Co

## 2021-10-26 ENCOUNTER — TELEPHONE (OUTPATIENT)
Dept: NEUROLOGY | Facility: CLINIC | Age: 41
End: 2021-10-26

## 2021-10-26 ENCOUNTER — OFFICE VISIT (OUTPATIENT)
Dept: NEUROLOGY | Facility: CLINIC | Age: 41
End: 2021-10-26
Payer: COMMERCIAL

## 2021-10-26 VITALS
HEART RATE: 85 BPM | HEIGHT: 69 IN | BODY MASS INDEX: 26.22 KG/M2 | RESPIRATION RATE: 16 BRPM | DIASTOLIC BLOOD PRESSURE: 75 MMHG | SYSTOLIC BLOOD PRESSURE: 118 MMHG | WEIGHT: 177 LBS

## 2021-10-26 DIAGNOSIS — F19.982 INSOMNIA DUE TO DRUG (HCC): ICD-10-CM

## 2021-10-26 DIAGNOSIS — G35 MULTIPLE SCLEROSIS (HCC): Primary | ICD-10-CM

## 2021-10-26 PROCEDURE — 3008F BODY MASS INDEX DOCD: CPT | Performed by: OTHER

## 2021-10-26 PROCEDURE — 3078F DIAST BP <80 MM HG: CPT | Performed by: OTHER

## 2021-10-26 PROCEDURE — 99214 OFFICE O/P EST MOD 30 MIN: CPT | Performed by: OTHER

## 2021-10-26 PROCEDURE — 96365 THER/PROPH/DIAG IV INF INIT: CPT | Performed by: OTHER

## 2021-10-26 PROCEDURE — 3074F SYST BP LT 130 MM HG: CPT | Performed by: OTHER

## 2021-10-26 RX ORDER — PREDNISONE 20 MG/1
TABLET ORAL
Qty: 30 TABLET | Refills: 0 | Status: SHIPPED | OUTPATIENT
Start: 2021-10-26 | End: 2022-01-20 | Stop reason: ALTCHOICE

## 2021-10-26 RX ORDER — METHYLPREDNISOLONE SODIUM SUCCINATE 500 MG/1
500 INJECTION, POWDER, FOR SOLUTION INTRAMUSCULAR; INTRAVENOUS DAILY
Status: DISCONTINUED | OUTPATIENT
Start: 2021-10-26 | End: 2021-10-26

## 2021-10-26 RX ORDER — ZOLPIDEM TARTRATE 10 MG/1
10 TABLET ORAL NIGHTLY PRN
Qty: 30 TABLET | Refills: 0 | Status: SHIPPED | OUTPATIENT
Start: 2021-10-26 | End: 2021-10-27 | Stop reason: CLARIF

## 2021-10-26 RX ORDER — METHYLPREDNISOLONE SODIUM SUCCINATE 500 MG/1
500 INJECTION, POWDER, FOR SOLUTION INTRAMUSCULAR; INTRAVENOUS DAILY
Status: COMPLETED | OUTPATIENT
Start: 2021-10-26 | End: 2021-10-28

## 2021-10-26 RX ORDER — ZOLPIDEM TARTRATE 10 MG/1
10 TABLET ORAL NIGHTLY PRN
Qty: 30 TABLET | Refills: 0 | Status: CANCELLED | OUTPATIENT
Start: 2021-10-26

## 2021-10-26 RX ADMIN — METHYLPREDNISOLONE SODIUM SUCCINATE 500 MG: 500 INJECTION, POWDER, FOR SOLUTION INTRAMUSCULAR; INTRAVENOUS at 14:28:00

## 2021-10-26 NOTE — PROGRESS NOTES
[facility] with [facility]  Anderson Samuel  11/3/1980  Primary Care Provider:  Kathleen Ansari    10/26/2021  Accompanied visit:      (x) No.      45year old yo patient being seen for:  MS    Prev Vitamin (ONE-DAILY MULTI VITAMINS) Oral Tab, Take 1 tablet by mouth daily. , Disp: , Rfl:   PRN:     Allergies:  No Known Allergies         EXAM:  /75 (BP Location: Left arm, Patient Position: Sitting, Cuff Size: adult)   Pulse 85   Resp 16   Ht 69\" time with patiern or authorized Fam member--non F2F  ( x ) other records reviewed --non F2F including consultations  (  ) [facility] meetings - patient not present --non F2F  (  ) Independent Historian obtained    Non Face to Face CPT code 53683/54420 applies as

## 2021-10-26 NOTE — PROGRESS NOTES
Patient walked 25 feet in 6 seconds. Patient does not use an assistive device. [facility]

## 2021-10-27 ENCOUNTER — TELEPHONE (OUTPATIENT)
Dept: NEUROLOGY | Facility: CLINIC | Age: 41
End: 2021-10-27

## 2021-10-27 ENCOUNTER — NURSE ONLY (OUTPATIENT)
Dept: NEUROLOGY | Facility: CLINIC | Age: 41
End: 2021-10-27
Payer: COMMERCIAL

## 2021-10-27 VITALS
RESPIRATION RATE: 18 BRPM | SYSTOLIC BLOOD PRESSURE: 138 MMHG | WEIGHT: 177 LBS | DIASTOLIC BLOOD PRESSURE: 86 MMHG | BODY MASS INDEX: 26.22 KG/M2 | HEIGHT: 69 IN

## 2021-10-27 DIAGNOSIS — G35 MULTIPLE SCLEROSIS (HCC): ICD-10-CM

## 2021-10-27 PROCEDURE — 3075F SYST BP GE 130 - 139MM HG: CPT | Performed by: PHYSICIAN ASSISTANT

## 2021-10-27 PROCEDURE — 3008F BODY MASS INDEX DOCD: CPT | Performed by: PHYSICIAN ASSISTANT

## 2021-10-27 PROCEDURE — 96365 THER/PROPH/DIAG IV INF INIT: CPT | Performed by: OTHER

## 2021-10-27 PROCEDURE — 3079F DIAST BP 80-89 MM HG: CPT | Performed by: PHYSICIAN ASSISTANT

## 2021-10-27 RX ORDER — ZOLPIDEM TARTRATE 10 MG/1
TABLET ORAL
Qty: 30 TABLET | Refills: 0 | OUTPATIENT
Start: 2021-10-27

## 2021-10-27 RX ORDER — ZOLPIDEM TARTRATE 10 MG/1
10 TABLET ORAL NIGHTLY PRN
Qty: 30 TABLET | Refills: 0 | Status: SHIPPED | OUTPATIENT
Start: 2021-10-27

## 2021-10-27 RX ADMIN — METHYLPREDNISOLONE SODIUM SUCCINATE 500 MG: 500 INJECTION, POWDER, FOR SOLUTION INTRAMUSCULAR; INTRAVENOUS at 11:23:00

## 2021-10-27 NOTE — TELEPHONE ENCOUNTER
Prior authorization request completed for: Solumedrol   Authorization # No PA needed per health plan   Authorization dates: 10/27/2021 - 10/28/2021  CPT codes approved:  / 31049  Number of visits/dates of service approved: 2  Physician: Kamar Johnson

## 2021-10-27 NOTE — PROGRESS NOTES
IV started in right antecubital per Nandini Weeks RN and Solu-Medrol infused over 20 minutes without difficulty. IV discontinued per RN. Patient tolerated infusion well with no complications.     Patient states he has much improvement in leg strength and less back

## 2021-10-27 NOTE — TELEPHONE ENCOUNTER
PA Request form for [facility] completed and faxed with OV notes (10/26/2021) to [phone] with confirmation fax.

## 2021-10-28 ENCOUNTER — NURSE ONLY (OUTPATIENT)
Dept: NEUROLOGY | Facility: CLINIC | Age: 41
End: 2021-10-28
Payer: COMMERCIAL

## 2021-10-28 VITALS — RESPIRATION RATE: 18 BRPM | HEART RATE: 68 BPM | DIASTOLIC BLOOD PRESSURE: 54 MMHG | SYSTOLIC BLOOD PRESSURE: 120 MMHG

## 2021-10-28 DIAGNOSIS — G35 MULTIPLE SCLEROSIS (HCC): ICD-10-CM

## 2021-10-28 PROCEDURE — 3078F DIAST BP <80 MM HG: CPT | Performed by: OTHER

## 2021-10-28 PROCEDURE — 96365 THER/PROPH/DIAG IV INF INIT: CPT | Performed by: OTHER

## 2021-10-28 PROCEDURE — 3074F SYST BP LT 130 MM HG: CPT | Performed by: OTHER

## 2021-10-28 RX ADMIN — METHYLPREDNISOLONE SODIUM SUCCINATE 500 MG: 500 INJECTION, POWDER, FOR SOLUTION INTRAMUSCULAR; INTRAVENOUS at 11:23:00

## 2021-10-28 NOTE — PROGRESS NOTES
IV started per Yeimi Dunn RN into right [facility] and Solu-Medrol infused over 20 minutes without difficulty. IV discontinued per RN. Patient tolerated infusion well with no complications.

## 2021-11-17 DIAGNOSIS — G35 MULTIPLE SCLEROSIS (HCC): ICD-10-CM

## 2021-11-17 RX ORDER — BACLOFEN 10 MG/1
TABLET ORAL
Qty: 60 TABLET | Refills: 5 | Status: SHIPPED | OUTPATIENT
Start: 2021-11-17

## 2021-11-17 NOTE — TELEPHONE ENCOUNTER
Medication: Baclofen 10mg     Date of last refill: 8/7/20 (#60/5R)  [place]     Last office visit: 10/26/21  Due back to clinic per last office note:  6 months  Date next office visit scheduled:    Future Appointment

## 2021-12-08 ENCOUNTER — TELEPHONE (OUTPATIENT)
Dept: NEUROLOGY | Facility: CLINIC | Age: 41
End: 2021-12-08

## 2021-12-08 DIAGNOSIS — G35 MS (MULTIPLE SCLEROSIS) (HCC): Primary | ICD-10-CM

## 2021-12-08 NOTE — TELEPHONE ENCOUNTER
Patient seeking new MRI orders. Last MRI's;    MRI CERVICAL + THORACIC 11/7/2020  MRI BRAIN 8/22/2020. Routed to provider for advice. LOVN:    Discussion plus Diagnostics & Treatment Orders:   We will do another 3 days of Solu-Medrol 500 mg IV follow

## 2021-12-10 NOTE — TELEPHONE ENCOUNTER
RN called and LM for the patient informing him the MRI's were ordered. RN advised that the PA team will call him once the PA's are approved.      Upon approval he can schedule the test.

## 2021-12-16 ENCOUNTER — HOSPITAL ENCOUNTER (OUTPATIENT)
Dept: MRI IMAGING | Age: 41
Discharge: HOME | End: 2021-12-16
Attending: PHYSICIAN ASSISTANT
Payer: COMMERCIAL

## 2021-12-16 DIAGNOSIS — G35 MS (MULTIPLE SCLEROSIS) (HCC): ICD-10-CM

## 2021-12-16 PROCEDURE — A9575 INJ GADOTERATE MEGLUMI 0.1ML: HCPCS | Performed by: PHYSICIAN ASSISTANT

## 2021-12-16 PROCEDURE — 70553 MRI BRAIN STEM W/O & W/DYE: CPT | Performed by: PHYSICIAN ASSISTANT

## 2021-12-21 ENCOUNTER — HOSPITAL ENCOUNTER (OUTPATIENT)
Dept: MRI IMAGING | Age: 41
Discharge: HOME | End: 2021-12-21
Attending: PHYSICIAN ASSISTANT
Payer: COMMERCIAL

## 2021-12-21 DIAGNOSIS — G35 MS (MULTIPLE SCLEROSIS) (HCC): ICD-10-CM

## 2021-12-21 PROCEDURE — 72157 MRI CHEST SPINE W/O & W/DYE: CPT | Performed by: PHYSICIAN ASSISTANT

## 2021-12-21 PROCEDURE — A9575 INJ GADOTERATE MEGLUMI 0.1ML: HCPCS | Performed by: PHYSICIAN ASSISTANT

## 2021-12-21 PROCEDURE — 72156 MRI NECK SPINE W/O & W/DYE: CPT | Performed by: PHYSICIAN ASSISTANT

## 2021-12-30 DIAGNOSIS — G35 MULTIPLE SCLEROSIS (HCC): ICD-10-CM

## 2021-12-30 DIAGNOSIS — G04.91 MYELITIS (HCC): ICD-10-CM

## 2022-01-02 DIAGNOSIS — G04.91 MYELITIS (HCC): ICD-10-CM

## 2022-01-02 DIAGNOSIS — G35 MULTIPLE SCLEROSIS (HCC): ICD-10-CM

## 2022-01-03 RX ORDER — TRAMADOL HYDROCHLORIDE 50 MG/1
50 TABLET ORAL 2 TIMES DAILY PRN
Qty: 60 TABLET | Refills: 5 | Status: SHIPPED | OUTPATIENT
Start: 2022-01-03

## 2022-01-03 RX ORDER — TRAMADOL HYDROCHLORIDE 50 MG/1
TABLET ORAL
Qty: 60 TABLET | Refills: 0 | OUTPATIENT
Start: 2022-01-03

## 2022-01-03 NOTE — TELEPHONE ENCOUNTER
Medication: traMADol HCl 50 MG       Date of last refill: 7/19/21 (#60/5R)  [place] 17/5/85     Last office visit: 10/26/21  Due back to clinic per last office note:  6 mon  Date next office visit scheduled:    No future

## 2022-01-03 NOTE — TELEPHONE ENCOUNTER
Tramadol 50mg refilled 1/3/22  Request not appropriate, duplicate    RN, please refuse as this is a duplicate request

## 2022-01-20 ENCOUNTER — OFFICE VISIT (OUTPATIENT)
Dept: NEUROLOGY | Facility: CLINIC | Age: 42
End: 2022-01-20
Payer: COMMERCIAL

## 2022-01-20 ENCOUNTER — TELEPHONE (OUTPATIENT)
Dept: NEUROLOGY | Facility: CLINIC | Age: 42
End: 2022-01-20

## 2022-01-20 VITALS
SYSTOLIC BLOOD PRESSURE: 125 MMHG | WEIGHT: 176 LBS | HEART RATE: 74 BPM | DIASTOLIC BLOOD PRESSURE: 67 MMHG | BODY MASS INDEX: 26.07 KG/M2 | HEIGHT: 69 IN | RESPIRATION RATE: 16 BRPM

## 2022-01-20 DIAGNOSIS — G89.29 CHRONIC BILATERAL LOW BACK PAIN WITHOUT SCIATICA: ICD-10-CM

## 2022-01-20 DIAGNOSIS — M54.50 CHRONIC BILATERAL LOW BACK PAIN WITHOUT SCIATICA: ICD-10-CM

## 2022-01-20 DIAGNOSIS — G35 MULTIPLE SCLEROSIS (HCC): Primary | ICD-10-CM

## 2022-01-20 DIAGNOSIS — G35 MS (MULTIPLE SCLEROSIS) (HCC): Primary | ICD-10-CM

## 2022-01-20 PROCEDURE — 3074F SYST BP LT 130 MM HG: CPT | Performed by: PHYSICIAN ASSISTANT

## 2022-01-20 PROCEDURE — 3078F DIAST BP <80 MM HG: CPT | Performed by: PHYSICIAN ASSISTANT

## 2022-01-20 PROCEDURE — 99214 OFFICE O/P EST MOD 30 MIN: CPT | Performed by: PHYSICIAN ASSISTANT

## 2022-01-20 PROCEDURE — 3008F BODY MASS INDEX DOCD: CPT | Performed by: PHYSICIAN ASSISTANT

## 2022-01-20 NOTE — PROGRESS NOTES
[facility] with [facility]  Morenita Solis  11/3/1980  Primary Care Provider:  Pramod Potter    1/20/2022  Accompanied visit: No  Previous visit and existing record notes reviewed in preparation consistent with the given clinical history multiple sclerosis.  No definitive new lesion is identified.              Review of Systems:  Review of Systems:  Denies systemic symptoms     No CP or SOB. No GI or  symptoms. Relevant Neuro as noted above. Abi Holt wide based gait.  Uses a cane      Problem/s Identified this visit:   MS (multiple sclerosis) ([facility] [address]  (primary encounter diagnosis)  Chronic bilateral low back pain without sciatica      Discussion plus Diagnostics & Treatment Orders:    MS- Currently stable

## 2022-01-20 NOTE — TELEPHONE ENCOUNTER
Fax received from [employer]. Patient use of Dalfampridine ER 10 mg tablets has been approved. Currently authorization in place effective 8/18/2021 - 2/18/2022. New authorization entered for continuation of therapy effective: 2/19/2022 - 2/19/2023.

## 2022-02-01 ENCOUNTER — TELEPHONE (OUTPATIENT)
Dept: NEUROLOGY | Facility: CLINIC | Age: 42
End: 2022-02-01

## 2022-02-01 NOTE — TELEPHONE ENCOUNTER
[address] stated recent OV notes on 1/20/22 stated pt is using a cane and verifying if prescribed by Dr. Lina Kim and questions related to pts usage of cane. Call Social Security Disability to discuss and verify.

## 2022-02-02 NOTE — TELEPHONE ENCOUNTER
RN called and LM for [facility] to answer her questions about patient's Disability paperwork.

## 2022-02-07 NOTE — TELEPHONE ENCOUNTER
RN spoke to [facility] at [place] [address] and confirmed that Dr. Monik Smith did not prescribe the cane the patient uses. Per Epic review patient was not prescribed a cane by the providers.

## 2022-02-14 ENCOUNTER — TELEPHONE (OUTPATIENT)
Dept: NEUROLOGY | Facility: CLINIC | Age: 42
End: 2022-02-14

## 2022-02-14 NOTE — TELEPHONE ENCOUNTER
Pain in both legs started 2 days ago after fall on ice onto his back. Was not evaluated by a provider. Also complains of bilateral leg weakness. Also complains of balance issues which have increased. Is med compliant, no other issues. Routed to provider for advice.

## 2022-02-14 NOTE — TELEPHONE ENCOUNTER
LM for patient (per consent) to call back and schedule appt to see Dr Antoni Albarran or Rey Hagan PA-C for evaluation.

## 2022-02-15 ENCOUNTER — OFFICE VISIT (OUTPATIENT)
Dept: NEUROLOGY | Facility: CLINIC | Age: 42
End: 2022-02-15
Payer: COMMERCIAL

## 2022-02-15 ENCOUNTER — TELEPHONE (OUTPATIENT)
Dept: NEUROLOGY | Facility: CLINIC | Age: 42
End: 2022-02-15

## 2022-02-15 VITALS
BODY MASS INDEX: 26.07 KG/M2 | RESPIRATION RATE: 16 BRPM | HEART RATE: 68 BPM | WEIGHT: 176 LBS | SYSTOLIC BLOOD PRESSURE: 111 MMHG | HEIGHT: 69 IN | DIASTOLIC BLOOD PRESSURE: 56 MMHG

## 2022-02-15 DIAGNOSIS — G35 MULTIPLE SCLEROSIS (HCC): Primary | ICD-10-CM

## 2022-02-15 DIAGNOSIS — R26.89 BALANCE PROBLEM: ICD-10-CM

## 2022-02-15 DIAGNOSIS — G04.91 MYELITIS (HCC): ICD-10-CM

## 2022-02-15 DIAGNOSIS — R29.898 WEAKNESS OF LEFT LOWER EXTREMITY: ICD-10-CM

## 2022-02-15 PROCEDURE — 3074F SYST BP LT 130 MM HG: CPT | Performed by: OTHER

## 2022-02-15 PROCEDURE — 3008F BODY MASS INDEX DOCD: CPT | Performed by: OTHER

## 2022-02-15 PROCEDURE — 96365 THER/PROPH/DIAG IV INF INIT: CPT | Performed by: OTHER

## 2022-02-15 PROCEDURE — 99214 OFFICE O/P EST MOD 30 MIN: CPT | Performed by: OTHER

## 2022-02-15 PROCEDURE — 3078F DIAST BP <80 MM HG: CPT | Performed by: OTHER

## 2022-02-15 RX ORDER — ZOLPIDEM TARTRATE 10 MG/1
TABLET ORAL
Qty: 30 TABLET | Refills: 0 | Status: SHIPPED | OUTPATIENT
Start: 2022-02-15

## 2022-02-15 RX ORDER — METHYLPREDNISOLONE SODIUM SUCCINATE 500 MG/1
1000 INJECTION, POWDER, FOR SOLUTION INTRAMUSCULAR; INTRAVENOUS DAILY
Status: COMPLETED | OUTPATIENT
Start: 2022-02-15 | End: 2022-02-16

## 2022-02-15 RX ORDER — PREDNISONE 20 MG/1
TABLET ORAL
Qty: 30 TABLET | Refills: 0 | Status: SHIPPED | OUTPATIENT
Start: 2022-02-15 | End: 2022-02-16

## 2022-02-15 RX ORDER — PREDNISONE 20 MG/1
TABLET ORAL
Qty: 30 TABLET | Refills: 0 | Status: SHIPPED | OUTPATIENT
Start: 2022-02-18

## 2022-02-15 RX ADMIN — METHYLPREDNISOLONE SODIUM SUCCINATE 1000 MG: 500 INJECTION, POWDER, FOR SOLUTION INTRAMUSCULAR; INTRAVENOUS at 14:59:00

## 2022-02-15 NOTE — TELEPHONE ENCOUNTER
Prior authorization needed for 2 days of IV solumedrol in the [place] office. 1000 mg for Wed, 500 mg for Thursday.

## 2022-02-15 NOTE — TELEPHONE ENCOUNTER
Patient requesting a refill of his Ambien while he is taking his steroids IV and orally. Routed to provider for signature.

## 2022-02-15 NOTE — TELEPHONE ENCOUNTER
[facility] [phone] and spoke with Josh JOHNSTON. Codes [id number] and [id number] are valid and billable no authorization or predetermination required . Call reference #[id number].  Time on call 15:01 no

## 2022-02-15 NOTE — PROGRESS NOTES
IV started per Americo Shepherd RN into right antecubital, patient with complaints of discomfort after a few minutes. Infusion started into left antecubital and Solu-Medrol infused over 20 minutes without difficulty. IV discontinued per RN. Patient tolerated infusion well with no complications. Patient requested to have refill of sleeping medication.

## 2022-02-15 NOTE — PROGRESS NOTES
Patient walked 25 feet in 10 seconds. Patient uses the following assistive device(s):  single-point cane.

## 2022-02-16 ENCOUNTER — NURSE ONLY (OUTPATIENT)
Dept: NEUROLOGY | Facility: CLINIC | Age: 42
End: 2022-02-16
Payer: COMMERCIAL

## 2022-02-16 VITALS — DIASTOLIC BLOOD PRESSURE: 70 MMHG | RESPIRATION RATE: 16 BRPM | SYSTOLIC BLOOD PRESSURE: 130 MMHG | HEART RATE: 120 BPM

## 2022-02-16 DIAGNOSIS — G35 MULTIPLE SCLEROSIS (HCC): ICD-10-CM

## 2022-02-16 PROCEDURE — 3075F SYST BP GE 130 - 139MM HG: CPT | Performed by: OTHER

## 2022-02-16 PROCEDURE — 3078F DIAST BP <80 MM HG: CPT | Performed by: OTHER

## 2022-02-16 PROCEDURE — 96365 THER/PROPH/DIAG IV INF INIT: CPT | Performed by: OTHER

## 2022-02-16 RX ADMIN — METHYLPREDNISOLONE SODIUM SUCCINATE 1000 MG: 500 INJECTION, POWDER, FOR SOLUTION INTRAMUSCULAR; INTRAVENOUS at 10:15:00

## 2022-02-16 NOTE — PROGRESS NOTES
IV started per Americo Shepherd RN into right antecubital site and Solu-Medrol infused over 20 minutes without difficulty. IV discontinued per RN. Patient tolerated infusion well with no complications. Patient continues to ambulate with cane for assistance. Feels better today after first infusion.

## 2022-02-17 ENCOUNTER — NURSE ONLY (OUTPATIENT)
Dept: NEUROLOGY | Facility: CLINIC | Age: 42
End: 2022-02-17
Payer: COMMERCIAL

## 2022-02-17 VITALS — SYSTOLIC BLOOD PRESSURE: 120 MMHG | RESPIRATION RATE: 18 BRPM | DIASTOLIC BLOOD PRESSURE: 68 MMHG | HEART RATE: 92 BPM

## 2022-02-17 DIAGNOSIS — G35 MULTIPLE SCLEROSIS (HCC): ICD-10-CM

## 2022-02-17 PROCEDURE — 3074F SYST BP LT 130 MM HG: CPT | Performed by: OTHER

## 2022-02-17 PROCEDURE — 96365 THER/PROPH/DIAG IV INF INIT: CPT | Performed by: OTHER

## 2022-02-17 PROCEDURE — 3078F DIAST BP <80 MM HG: CPT | Performed by: OTHER

## 2022-02-17 RX ORDER — METHYLPREDNISOLONE SODIUM SUCCINATE 500 MG/1
500 INJECTION, POWDER, FOR SOLUTION INTRAMUSCULAR; INTRAVENOUS DAILY
Status: COMPLETED | OUTPATIENT
Start: 2022-02-17 | End: 2022-02-18

## 2022-02-17 RX ADMIN — METHYLPREDNISOLONE SODIUM SUCCINATE 500 MG: 500 INJECTION, POWDER, FOR SOLUTION INTRAMUSCULAR; INTRAVENOUS at 10:14:00

## 2022-02-17 NOTE — PROGRESS NOTES
IV started per Zen Wick RN into right antecubital site and Solu-Medrol infused over 20 minutes without difficulty. IV discontinued per RN. Patient tolerated infusion well with no complications. Patient voices improvement in symptoms. Able to ambulate without his cane. Per Epic review, patient to present to office for additional infusion tomorrow. Voiced understanding.

## 2022-02-18 ENCOUNTER — NURSE ONLY (OUTPATIENT)
Dept: NEUROLOGY | Facility: CLINIC | Age: 42
End: 2022-02-18
Payer: COMMERCIAL

## 2022-02-18 DIAGNOSIS — G35 MULTIPLE SCLEROSIS (HCC): ICD-10-CM

## 2022-02-18 PROCEDURE — 96365 THER/PROPH/DIAG IV INF INIT: CPT | Performed by: OTHER

## 2022-02-18 RX ADMIN — METHYLPREDNISOLONE SODIUM SUCCINATE 500 MG: 500 INJECTION, POWDER, FOR SOLUTION INTRAMUSCULAR; INTRAVENOUS at 10:20:00

## 2022-02-18 NOTE — PROGRESS NOTES
IV started per Renan Brush RN and Solu-Medrol infused over 20 minutes without difficulty. IV discontinued per RN. Patient tolerated infusion well with no complications. Patient states he is feeling slightly better, indicates L leg is slightly improved. Does not offer more information.

## 2022-02-25 ENCOUNTER — TELEPHONE (OUTPATIENT)
Dept: NEUROLOGY | Facility: CLINIC | Age: 42
End: 2022-02-25

## 2022-02-25 NOTE — TELEPHONE ENCOUNTER
RN called the pharmacy and informed the pharmacist he could fill the prescription early. Pharmacist informed the RN the pt has been refilling his prescriptions a few days early each month. RN informed the pharmacist she would inform the provider.

## 2022-02-25 NOTE — TELEPHONE ENCOUNTER
Pt stated he gets lots of back pain when he is taking steroids. He also takes extra Tramadol  for the pain and he is out.   The next refill is do on 02/28/22  Can we call pharmacy to release  it sooner

## 2022-03-21 ENCOUNTER — OFFICE VISIT (OUTPATIENT)
Dept: URGENT CARE | Age: 42
End: 2022-03-21

## 2022-03-21 VITALS
OXYGEN SATURATION: 98 % | SYSTOLIC BLOOD PRESSURE: 135 MMHG | RESPIRATION RATE: 16 BRPM | HEART RATE: 96 BPM | TEMPERATURE: 97.8 F | DIASTOLIC BLOOD PRESSURE: 80 MMHG

## 2022-03-21 DIAGNOSIS — L72.3 INFECTED SEBACEOUS CYST: Primary | ICD-10-CM

## 2022-03-21 DIAGNOSIS — L08.9 INFECTED SEBACEOUS CYST: Primary | ICD-10-CM

## 2022-03-21 PROCEDURE — 99203 OFFICE O/P NEW LOW 30 MIN: CPT | Performed by: FAMILY MEDICINE

## 2022-03-21 RX ORDER — OFATUMUMAB 20 MG/.4ML
INJECTION, SOLUTION SUBCUTANEOUS
COMMUNITY
Start: 2021-10-19

## 2022-03-21 RX ORDER — TRAMADOL HYDROCHLORIDE 50 MG/1
50 TABLET ORAL
COMMUNITY
Start: 2022-01-03

## 2022-03-21 RX ORDER — BACLOFEN 10 MG/1
TABLET ORAL
COMMUNITY
Start: 2021-11-17

## 2022-03-21 RX ORDER — ZOLPIDEM TARTRATE 10 MG/1
TABLET ORAL
COMMUNITY
Start: 2022-02-15

## 2022-03-21 RX ORDER — CEPHALEXIN 500 MG/1
1000 CAPSULE ORAL 2 TIMES DAILY
Qty: 28 CAPSULE | Refills: 0 | Status: SHIPPED | OUTPATIENT
Start: 2022-03-21 | End: 2022-03-28

## 2022-03-21 ASSESSMENT — PAIN SCALES - GENERAL: PAINLEVEL: 0

## 2022-05-04 ENCOUNTER — TELEPHONE (OUTPATIENT)
Dept: NEUROLOGY | Facility: CLINIC | Age: 42
End: 2022-05-04

## 2022-05-04 ENCOUNTER — OFFICE VISIT (OUTPATIENT)
Dept: NEUROLOGY | Facility: CLINIC | Age: 42
End: 2022-05-04
Payer: COMMERCIAL

## 2022-05-04 VITALS
HEART RATE: 88 BPM | HEIGHT: 71 IN | WEIGHT: 172 LBS | RESPIRATION RATE: 16 BRPM | SYSTOLIC BLOOD PRESSURE: 132 MMHG | BODY MASS INDEX: 24.08 KG/M2 | DIASTOLIC BLOOD PRESSURE: 61 MMHG

## 2022-05-04 DIAGNOSIS — G35 MULTIPLE SCLEROSIS (HCC): Primary | ICD-10-CM

## 2022-05-04 PROCEDURE — 3075F SYST BP GE 130 - 139MM HG: CPT | Performed by: OTHER

## 2022-05-04 PROCEDURE — 3078F DIAST BP <80 MM HG: CPT | Performed by: OTHER

## 2022-05-04 PROCEDURE — 96365 THER/PROPH/DIAG IV INF INIT: CPT | Performed by: OTHER

## 2022-05-04 PROCEDURE — 99214 OFFICE O/P EST MOD 30 MIN: CPT | Performed by: OTHER

## 2022-05-04 PROCEDURE — 3008F BODY MASS INDEX DOCD: CPT | Performed by: OTHER

## 2022-05-04 RX ORDER — ZOLPIDEM TARTRATE 10 MG/1
TABLET ORAL
Qty: 30 TABLET | Refills: 0 | OUTPATIENT
Start: 2022-05-04

## 2022-05-04 RX ORDER — METHYLPREDNISOLONE SODIUM SUCCINATE 500 MG/8ML
500 INJECTION INTRAMUSCULAR; INTRAVENOUS DAILY
Status: COMPLETED | OUTPATIENT
Start: 2022-05-04 | End: 2022-05-06

## 2022-05-04 RX ORDER — ZOLPIDEM TARTRATE 10 MG/1
10 TABLET ORAL NIGHTLY PRN
Qty: 30 TABLET | Refills: 0 | Status: SHIPPED | OUTPATIENT
Start: 2022-05-04

## 2022-05-04 RX ADMIN — METHYLPREDNISOLONE SODIUM SUCCINATE 500 MG: 500 INJECTION INTRAMUSCULAR; INTRAVENOUS at 12:40:00

## 2022-05-04 NOTE — TELEPHONE ENCOUNTER
Patient with MS follow up appointment. Discussion concerning DMT. Patient is interested in starting [place]. Currently on Kesimpta. Patient instructed on procedure for pre-testing/labs. Infusion center discussed. Patient to continue with current medication while awaiting new medication approval from insurance. Start up paperwork signed by patient. Lab testing entered in Epic. Will discuss at solumedrol infusion tomorrow.

## 2022-05-04 NOTE — TELEPHONE ENCOUNTER
[facility] [phone] and spoke with Shira Dorsey. CPT code for solumedrol [phone] and 56014 in the office are valid and billable no PA required    Call reference #[id number].  Time on call 14:03

## 2022-05-04 NOTE — TELEPHONE ENCOUNTER
Medication: ZOLPIDEM 10 MG Oral Tab     Date of last refill: 2/15/2022(#30/0)  [place] 6/04/2737     Last office visit: 2/15/2022  Due back to clinic per last office note:  4 months  Date next office visit scheduled:    Future Appointments   Date Time Provider Kika Torres   5/5/2022 10:00 AM [facility] Major Stagers [facility] [place]   7/25/2022 10:40 AM MD ZULEMA Dumont [facility] [place]           Last OV note recommendation:

## 2022-05-05 ENCOUNTER — NURSE ONLY (OUTPATIENT)
Dept: NEUROLOGY | Facility: CLINIC | Age: 42
End: 2022-05-05
Payer: COMMERCIAL

## 2022-05-05 VITALS — RESPIRATION RATE: 18 BRPM | SYSTOLIC BLOOD PRESSURE: 102 MMHG | HEART RATE: 92 BPM | DIASTOLIC BLOOD PRESSURE: 62 MMHG

## 2022-05-05 DIAGNOSIS — G35 MULTIPLE SCLEROSIS (HCC): ICD-10-CM

## 2022-05-05 PROCEDURE — 3074F SYST BP LT 130 MM HG: CPT | Performed by: OTHER

## 2022-05-05 PROCEDURE — 96365 THER/PROPH/DIAG IV INF INIT: CPT | Performed by: OTHER

## 2022-05-05 PROCEDURE — 3078F DIAST BP <80 MM HG: CPT | Performed by: OTHER

## 2022-05-05 RX ORDER — PREDNISONE 20 MG/1
TABLET ORAL
Qty: 30 TABLET | Refills: 0 | Status: SHIPPED | OUTPATIENT
Start: 2022-05-07

## 2022-05-05 RX ADMIN — METHYLPREDNISOLONE SODIUM SUCCINATE 500 MG: 500 INJECTION INTRAMUSCULAR; INTRAVENOUS at 10:18:00

## 2022-05-05 NOTE — PROGRESS NOTES
Patient requesting to have office contact pharmacy to have his prescription for tramadol released early. Contacted [facility]. Ok to have prescription filled 4 days early. Stated he misplaced current bottle.

## 2022-05-05 NOTE — PROGRESS NOTES
IV started per Ariel Crawford RN into right antecubital site and Solu-Medrol infused over 20 minutes without difficulty. IV discontinued per RN. Patient tolerated infusion well with no complications. States he is feeling better. Still noted to be unsteady with ambulation.

## 2022-05-06 ENCOUNTER — NURSE ONLY (OUTPATIENT)
Dept: NEUROLOGY | Facility: CLINIC | Age: 42
End: 2022-05-06
Payer: COMMERCIAL

## 2022-05-06 VITALS — DIASTOLIC BLOOD PRESSURE: 54 MMHG | HEART RATE: 84 BPM | SYSTOLIC BLOOD PRESSURE: 116 MMHG

## 2022-05-06 DIAGNOSIS — G35 MULTIPLE SCLEROSIS (HCC): ICD-10-CM

## 2022-05-06 PROCEDURE — 96365 THER/PROPH/DIAG IV INF INIT: CPT | Performed by: OTHER

## 2022-05-06 PROCEDURE — 3074F SYST BP LT 130 MM HG: CPT | Performed by: OTHER

## 2022-05-06 PROCEDURE — 3078F DIAST BP <80 MM HG: CPT | Performed by: OTHER

## 2022-05-06 RX ADMIN — METHYLPREDNISOLONE SODIUM SUCCINATE 500 MG: 500 INJECTION INTRAMUSCULAR; INTRAVENOUS at 10:45:00

## 2022-05-06 NOTE — PROGRESS NOTES
IV started per Dangelo Gunn RN into right [facility] and Solu-Medrol infused over 20 minutes without difficulty. IV discontinued per RN. Patient tolerated infusion well with no complications.

## 2022-06-07 DIAGNOSIS — G35 MULTIPLE SCLEROSIS (HCC): ICD-10-CM

## 2022-06-07 DIAGNOSIS — G04.91 MYELITIS (HCC): ICD-10-CM

## 2022-06-08 RX ORDER — TRAMADOL HYDROCHLORIDE 50 MG/1
50 TABLET ORAL 2 TIMES DAILY PRN
Qty: 60 TABLET | Refills: 5 | Status: SHIPPED | OUTPATIENT
Start: 2022-06-08

## 2022-06-15 RX ORDER — DALFAMPRIDINE 10 MG/1
TABLET, FILM COATED, EXTENDED RELEASE ORAL
Qty: 60 TABLET | Refills: 5 | Status: SHIPPED | OUTPATIENT
Start: 2022-06-15

## 2022-06-28 ENCOUNTER — TELEPHONE (OUTPATIENT)
Dept: NEUROLOGY | Facility: CLINIC | Age: 42
End: 2022-06-28

## 2022-06-28 NOTE — TELEPHONE ENCOUNTER
Lemtrada start up paperwork completed and faxed to [facility]  one to one for enrollment. Referral order for infusions faxed with receipt confirmation to [address] [place] [address] in [place]. Infusion center will obtain insurance authorization.

## 2022-06-28 NOTE — TELEPHONE ENCOUNTER
SRI PRE-ADMINISTRATION SCREENING:    Yes  :Adults ? 17  Yes  :History of RRMS (RELAPSING AND REMITTING MS)  Yes  :Trial and Failure of 2 MS disease modifiers, Avonex and Copaxone, currently on Kathryn Caicedo   Denies  :History of skin cancer or melanoma  Pending  :HIV negative, date tested pending  Pending  :Negative Tb test, date tested pending   Yes :Negative Hepatitis B test, date 11/20/2020  pending  :Negative Hepatitis C test, date tested pending  No  :Current treatment for malignancy  No  :Current use of immunosuppressants for concurrent conditions  Yes  :History of varicella disease, varicella vaccine, or varicella immunity  N/A  :Currently Pregnant or Breastfeeding  No  :History of use of Campath for Lymphoma  Yes  :Agree to monthly lab testing and multiple office visits for up to 5 years post-treatment

## 2022-06-29 NOTE — TELEPHONE ENCOUNTER
Roshni Coffman calling to check status of Lemtrada. Advised that it has been sent to infusion facility but in order to complete PA, he will need to do bloodwork (orders are in system). Additionally, Roshni Coffman needs to have his baseline dermatology exam performed. Patient notified, verbalized understanding.

## 2022-06-30 ENCOUNTER — LAB ENCOUNTER (OUTPATIENT)
Dept: LAB | Age: 42
End: 2022-06-30
Attending: Other
Payer: COMMERCIAL

## 2022-06-30 DIAGNOSIS — G35 MULTIPLE SCLEROSIS (HCC): ICD-10-CM

## 2022-06-30 LAB — HCV AB SERPL QL IA: NONREACTIVE

## 2022-06-30 PROCEDURE — 86787 VARICELLA-ZOSTER ANTIBODY: CPT

## 2022-06-30 PROCEDURE — 86480 TB TEST CELL IMMUN MEASURE: CPT

## 2022-06-30 PROCEDURE — 87389 HIV-1 AG W/HIV-1&-2 AB AG IA: CPT

## 2022-06-30 PROCEDURE — 36415 COLL VENOUS BLD VENIPUNCTURE: CPT

## 2022-06-30 PROCEDURE — 86803 HEPATITIS C AB TEST: CPT

## 2022-07-01 LAB — VZV IGG SER IA-ACNC: 513.6 (ref 165–?)

## 2022-07-03 LAB — VARICELLA-ZOSTER VIRUS AB, IGM: 0.05 ISR

## 2022-07-05 LAB
M TB IFN-G CD4+ T-CELLS BLD-ACNC: 0.1 IU/ML
M TB TUBERC IFN-G BLD QL: NEGATIVE
M TB TUBERC IGNF/MITOGEN IGNF CONTROL: >10 IU/ML
QFT TB1 AG MINUS NIL: 0.07 IU/ML
QFT TB2 AG MINUS NIL: -0.01 IU/ML

## 2022-07-07 NOTE — TELEPHONE ENCOUNTER
Received call from [facility] requesting to have preliminary labs for [facility] sent to [facility]. Labs resulted in Epic and faxed to [facility] at [phone] with receipt confirmation.

## 2022-07-13 NOTE — TELEPHONE ENCOUNTER
Received call from [facility] who states they had discussion with patient regarding his future Lemtrada infusions. Patient made aware of need for monthly blood monitoring for 48 months after last infusion. Patient is still attempting to schedule Derm appointment with goal to infuse at the end of the month. Will need new baseline labs prior to infusion. Will fax Lab ruben sheet for patient to have drawn.

## 2022-07-18 ENCOUNTER — TELEPHONE (OUTPATIENT)
Dept: NEUROLOGY | Facility: CLINIC | Age: 42
End: 2022-07-18

## 2022-07-18 DIAGNOSIS — G35 MS (MULTIPLE SCLEROSIS) (HCC): Primary | ICD-10-CM

## 2022-07-19 NOTE — TELEPHONE ENCOUNTER
Patient questioning why he needs to see a dermatologist. It is part of the Lemtrada protocol that patients see a dermatologist yearly as Cuco James can cause skin cancer. Baseline lab orders placed in Epic. Patient is planning on infusing the week of Aug 7-12. Patient needs to complete pre-testing prior to infusions.

## 2022-07-20 ENCOUNTER — TELEPHONE (OUTPATIENT)
Dept: NEUROLOGY | Facility: CLINIC | Age: 42
End: 2022-07-20

## 2022-07-25 ENCOUNTER — OFFICE VISIT (OUTPATIENT)
Dept: NEUROLOGY | Facility: CLINIC | Age: 42
End: 2022-07-25
Payer: COMMERCIAL

## 2022-07-25 VITALS
SYSTOLIC BLOOD PRESSURE: 135 MMHG | RESPIRATION RATE: 16 BRPM | DIASTOLIC BLOOD PRESSURE: 60 MMHG | HEIGHT: 71 IN | HEART RATE: 88 BPM | WEIGHT: 172 LBS | BODY MASS INDEX: 24.08 KG/M2

## 2022-07-25 DIAGNOSIS — G35 MS (MULTIPLE SCLEROSIS) (HCC): Primary | ICD-10-CM

## 2022-07-25 DIAGNOSIS — G04.91 MYELITIS (HCC): ICD-10-CM

## 2022-07-27 ENCOUNTER — TELEPHONE (OUTPATIENT)
Dept: NEUROLOGY | Facility: CLINIC | Age: 42
End: 2022-07-27

## 2022-07-27 NOTE — TELEPHONE ENCOUNTER
RN spoke to the patient and was informed he is out of his tramadol and would like to fill it early. The medication should not be filled until August 7th. RN explained the above situation to him. Patient told the RN his wife threw away the rest of his medication. RN explained he is to be taking his medication as needed. RN advised she would pass it along to the provider for further review.

## 2022-07-27 NOTE — TELEPHONE ENCOUNTER
Patient requesting to have his refill on Tramadol early since he is going to be starting [place] on Aug 8th and doesn't want to leave the house. Per Epic review, patient with refills on file for medication. Will call and have medication prepared for patient.

## 2022-07-28 NOTE — TELEPHONE ENCOUNTER
RN called the patient and advised we will not be able refill his tramadol early. Pt verbalized understanding and did not have any further questions.

## 2022-07-29 ENCOUNTER — LAB ENCOUNTER (OUTPATIENT)
Dept: LAB | Age: 42
End: 2022-07-29
Attending: Other
Payer: COMMERCIAL

## 2022-07-29 DIAGNOSIS — G35 MS (MULTIPLE SCLEROSIS) (HCC): ICD-10-CM

## 2022-07-29 LAB
BASOPHILS # BLD AUTO: 0.05 X10(3) UL (ref 0–0.2)
BASOPHILS NFR BLD AUTO: 0.9 %
BILIRUB UR QL STRIP.AUTO: NEGATIVE
CLARITY UR REFRACT.AUTO: CLEAR
COLOR UR AUTO: YELLOW
CREAT BLD-MCNC: 0.85 MG/DL
EOSINOPHIL # BLD AUTO: 0.15 X10(3) UL (ref 0–0.7)
EOSINOPHIL NFR BLD AUTO: 2.8 %
ERYTHROCYTE [DISTWIDTH] IN BLOOD BY AUTOMATED COUNT: 12.3 %
GLUCOSE UR STRIP.AUTO-MCNC: NEGATIVE MG/DL
HCT VFR BLD AUTO: 41.4 %
HGB BLD-MCNC: 13.3 G/DL
IMM GRANULOCYTES # BLD AUTO: 0.01 X10(3) UL (ref 0–1)
IMM GRANULOCYTES NFR BLD: 0.2 %
KETONES UR STRIP.AUTO-MCNC: NEGATIVE MG/DL
LEUKOCYTE ESTERASE UR QL STRIP.AUTO: NEGATIVE
LYMPHOCYTES # BLD AUTO: 1.29 X10(3) UL (ref 1–4)
LYMPHOCYTES NFR BLD AUTO: 24.4 %
MCH RBC QN AUTO: 29.2 PG (ref 26–34)
MCHC RBC AUTO-ENTMCNC: 32.1 G/DL (ref 31–37)
MCV RBC AUTO: 90.8 FL
MONOCYTES # BLD AUTO: 0.43 X10(3) UL (ref 0.1–1)
MONOCYTES NFR BLD AUTO: 8.1 %
NEUTROPHILS # BLD AUTO: 3.36 X10 (3) UL (ref 1.5–7.7)
NEUTROPHILS # BLD AUTO: 3.36 X10(3) UL (ref 1.5–7.7)
NEUTROPHILS NFR BLD AUTO: 63.6 %
NITRITE UR QL STRIP.AUTO: NEGATIVE
PH UR STRIP.AUTO: 6.5 [PH] (ref 5–8)
PLATELET # BLD AUTO: 339 10(3)UL (ref 150–450)
PROT UR STRIP.AUTO-MCNC: NEGATIVE MG/DL
RBC # BLD AUTO: 4.56 X10(6)UL
RBC #/AREA URNS AUTO: >10 /HPF
SP GR UR STRIP.AUTO: 1.02 (ref 1–1.03)
UROBILINOGEN UR STRIP.AUTO-MCNC: 0.2 MG/DL
WBC # BLD AUTO: 5.3 X10(3) UL (ref 4–11)

## 2022-07-29 PROCEDURE — 36415 COLL VENOUS BLD VENIPUNCTURE: CPT

## 2022-07-29 PROCEDURE — 82565 ASSAY OF CREATININE: CPT

## 2022-07-29 PROCEDURE — 85025 COMPLETE CBC W/AUTO DIFF WBC: CPT

## 2022-07-29 PROCEDURE — 81001 URINALYSIS AUTO W/SCOPE: CPT

## 2022-08-02 NOTE — TELEPHONE ENCOUNTER
RN contacted to the patient to follow-up on his dermatology assessment appt. Per patient he had the appt on July 29th. RN called [facility] and LM to be called back to request the results.  [phone]

## 2022-08-03 ENCOUNTER — TELEPHONE (OUTPATIENT)
Dept: NEUROLOGY | Facility: CLINIC | Age: 42
End: 2022-08-03

## 2022-08-03 DIAGNOSIS — G35 MULTIPLE SCLEROSIS (HCC): Primary | ICD-10-CM

## 2022-08-03 DIAGNOSIS — G35 MULTIPLE SCLEROSIS (HCC): ICD-10-CM

## 2022-08-03 RX ORDER — PREDNISONE 20 MG/1
TABLET ORAL
Qty: 30 TABLET | Refills: 0 | Status: SHIPPED | OUTPATIENT
Start: 2022-08-13

## 2022-08-03 RX ORDER — ZOLPIDEM TARTRATE 10 MG/1
10 TABLET ORAL NIGHTLY PRN
Qty: 30 TABLET | Refills: 0 | Status: SHIPPED | OUTPATIENT
Start: 2022-08-03

## 2022-08-03 RX ORDER — CETIRIZINE HYDROCHLORIDE 10 MG/1
CAPSULE, LIQUID FILLED ORAL
Qty: 14 CAPSULE | Refills: 0 | Status: SHIPPED | OUTPATIENT
Start: 2022-08-07

## 2022-08-03 RX ORDER — ACYCLOVIR 200 MG/1
CAPSULE ORAL
Qty: 60 CAPSULE | Refills: 5 | Status: SHIPPED | OUTPATIENT
Start: 2022-08-05

## 2022-08-03 RX ORDER — FAMOTIDINE 20 MG/1
TABLET, FILM COATED ORAL
Qty: 28 TABLET | Refills: 0 | Status: SHIPPED | OUTPATIENT
Start: 2022-08-07

## 2022-08-03 RX ORDER — PREDNISONE 10 MG/1
TABLET ORAL
Qty: 5 TABLET | Refills: 0 | Status: SHIPPED | OUTPATIENT
Start: 2022-08-07

## 2022-08-03 NOTE — TELEPHONE ENCOUNTER
Your BMI is Body mass index is 30.19 kg/m .  Weight management is a personal decision.  If you are interested in exploring weight loss strategies, the following discussion covers the approaches that may be successful. Body mass index (BMI) is one way to tell whether you are at a healthy weight, overweight, or obese. It measures your weight in relation to your height.  A BMI of 18.5 to 24.9 is in the healthy range. A person with a BMI of 25 to 29.9 is considered overweight, and someone with a BMI of 30 or greater is considered obese. More than two-thirds of American adults are considered overweight or obese.  Being overweight or obese increases the risk for further weight gain. Excess weight may lead to heart disease and diabetes.  Creating and following plans for healthy eating and physical activity may help you improve your health.  Weight control is part of healthy lifestyle and includes exercise, emotional health, and healthy eating habits. Careful eating habits lifelong are the mainstay of weight control. Though there are significant health benefits from weight loss, long-term weight loss with diet alone may be very difficult to achieve- studies show long-term success with dietary management in less than 10% of people. Attaining a healthy weight may be especially difficult to achieve in those with severe obesity. In some cases, medications, devices and surgical management might be considered.  What can you do?  If you are overweight or obese and are interested in methods for weight loss, you should discuss this with your provider.     Consider reducing daily calorie intake by 500 calories.     Keep a food journal.     Avoiding skipping meals, consider cutting portions instead.    Diet combined with exercise helps maintain muscle while optimizing fat loss. Strength training is particularly important for building and maintaining muscle mass. Exercise helps reduce stress, increase energy, and improves fitness.  Patient is scheduled for Lemtrada infusions next week. Requesting to have refill of his Ambien.     Medication pended for provider approval. Increasing exercise without diet control, however, may not burn enough calories to loose weight.       Start walking three days a week 10-20 minutes at a time    Work towards walking thirty minutes five days a week     Eventually, increase the speed of your walking for 1-2 minutes at time    In addition, we recommend that you review healthy lifestyles and methods for weight loss available through the National Institutes of Health patient information sites:  http://win.niddk.nih.gov/publications/index.htm    And look into health and wellness programs that may be available through your health insurance provider, employer, local community center, or kiel club.    Weight management plan: Patient was referred to their PCP to discuss a diet and exercise plan.

## 2022-08-03 NOTE — TELEPHONE ENCOUNTER
Patient scheduled to receive his Lemtrada infusions starting Monday, Aug 8th. Patient to take the following premedications. Zyrtec 10 mg daily po for 14 days, start Sunday prior to infusions 8/7/2022    Pepcid 20 mg twice daily po for 14 days, start Sunday prior to infusions 8/7/2022    Prednisone 50 mg once on Sunday prior to infusions 8/7/2022    Acyclovir 200 mg twice daily for 3 months, start Friday prior to infusions 8/5/2022    Oral steroid taper, start 8/13/2022    Instructions sent to patient via [employer].

## 2022-08-09 ENCOUNTER — TELEPHONE (OUTPATIENT)
Dept: NEUROLOGY | Facility: CLINIC | Age: 42
End: 2022-08-09

## 2022-08-09 NOTE — TELEPHONE ENCOUNTER
Fax received from [facility] with details of infusion record. Shilpi Day #1/5 infusion record. Patient infused first dose on 8/8/2022. Patient presented to infusion center and provided with THE [facility] education guide written and verbally. Any questions answered. Patient has been taking pre-medication as ordered. Patient premedicated with:    Acetaminophen 1000 mg orally once  Diphenhydramine 50 mg orally once  Solumedrol 1000 mg IV once over 1 hour    Patient infused over 4 hours and completed without adverse reaction. Patient stayed for 2 hour observation period post infusion. Next infusion scheduled for 8/9/2022, day #2/5. Discharge document signed.

## 2022-08-10 NOTE — TELEPHONE ENCOUNTER
Patient presented to [address] [place] [address] on 8/9/2022 for day #2/5 of round 1 [facility]. Patient only complaint after first infusion was difficulty sleeping but is common for him when he receives steroids. Patient premedicated with:    Solumedrol 1000 mg IV administered over 1 hour  Acetaminophen 1000 mg orally once  Diphenhydramine 50 mg orally once    Patient infusion completed without incident over 4 hours. Patient stable during the post infusion observation period. Patient given discharge instructions and signed discharge document. Patient scheduled for dose #3/5 on 8/10/2022.

## 2022-08-11 ENCOUNTER — TELEPHONE (OUTPATIENT)
Dept: NEUROLOGY | Facility: CLINIC | Age: 42
End: 2022-08-11

## 2022-08-11 NOTE — TELEPHONE ENCOUNTER
Order for monthly standing orders faxed to [facility] with receipt confirmation. Patient will need monthly labs for 48 months post infusion completion.

## 2022-08-11 NOTE — TELEPHONE ENCOUNTER
Patient presented to [facility] on 8/10/2022. Patient reported some chest redness in the morning prior to infusion along with difficulty sleeping over night and having fatigue and weakness. Some gait disturbance which requires him to use a cane when walking. Patient premedicated with:    Acetaminophen 1000 mg orally once  Diphenhydramine 50 mg orally once  Methylprednisolone 1000 mg IVPB over one hour    Patient infused his Lemtrada without side effects. Patient stayed for the 2 hour observation period without issues.     Next scheduled dose: 8/11/2022 for day #4

## 2022-08-16 NOTE — TELEPHONE ENCOUNTER
Fax received from [facility] with documentation of Lemtrada round 1, Day #5/5 infusion record. Patient with complaints of back pain, ambulating with cane. Presented to [facility] for last infusion on 8/12/2022. Patient reported some chest redness in the morning along with difficulty sleeping over night. Increased fatigue and weakness. Patient premedicated with:    Acetaminophen 1000 mg orally once  Diphenhydramine 50 mg orally once  Methylprednisolone 1000 mg IVPB over 1 hour prior to infusion    Lemtrada infused with incident over 4 hours. Patient without reactions during the 2 hour post infusion observation period. No future infusion appointment at this time. Round 1 completed. Patient required to start monthly lab testing.

## 2022-08-22 ENCOUNTER — TELEPHONE (OUTPATIENT)
Dept: NEUROLOGY | Facility: CLINIC | Age: 42
End: 2022-08-22

## 2022-08-22 NOTE — TELEPHONE ENCOUNTER
S: Haley Childress in left arm    B: Lemtrada ending 8/12/22. A: last week noted a \"pimple\" in left arm, getting bigger. Currently slightly smaller than a quarter. Notes that in the middle is white, red around, like a pimple. No radiation of redness or swelling. No fever. Hurts when he touches it.    R: Will notify Dr. Maldonado Helton. Patient recommended to present to PCP for evaluation, possible need for antibiotics.

## 2022-09-07 ENCOUNTER — TELEPHONE (OUTPATIENT)
Dept: NEUROLOGY | Facility: CLINIC | Age: 42
End: 2022-09-07

## 2022-09-07 NOTE — TELEPHONE ENCOUNTER
RN received a call from [facility] [place]. Patient was at the lab and was informed when she pulled up his chart it has the wrong mailing address. Per Celine the address in the [employer] system needs to be corrected.

## 2022-09-08 LAB
ABSOLUTE BASOPHILS: 21 CELLS/UL (ref 0–200)
ABSOLUTE EOSINOPHILS: 21 CELLS/UL (ref 15–500)
ABSOLUTE LYMPHOCYTES: 174 CELLS/UL (ref 850–3900)
ABSOLUTE MONOCYTES: 225 CELLS/UL (ref 200–950)
ABSOLUTE NEUTROPHILS: 859 CELLS/UL (ref 1500–7800)
ALT: 25 U/L (ref 9–46)
APPEARANCE: CLEAR
AST: 26 U/L (ref 10–40)
BASOPHILS: 1.6 %
BILIRUBIN, TOTAL: 0.4 MG/DL (ref 0.2–1.2)
BILIRUBIN: NEGATIVE
COLOR: YELLOW
CREATININE: 0.75 MG/DL (ref 0.6–1.29)
EGFR: 117 ML/MIN/1.73M2
EOSINOPHILS: 1.6 %
GLUCOSE: NEGATIVE
HEMATOCRIT: 39.8 % (ref 38.5–50)
HEMOGLOBIN: 12.9 G/DL (ref 13.2–17.1)
KETONES: NEGATIVE
LEUKOCYTE ESTERASE: NEGATIVE
LYMPHOCYTES: 13.4 %
MCH: 28.9 PG (ref 27–33)
MCHC: 32.4 G/DL (ref 32–36)
MCV: 89 FL (ref 80–100)
MONOCYTES: 17.3 %
MPV: 10.5 FL (ref 7.5–12.5)
NEUTROPHILS: 66.1 %
NITRITE: NEGATIVE
PH: 6 (ref 5–8)
PLATELET COUNT: 275 THOUSAND/UL (ref 140–400)
PROTEIN: NEGATIVE
RDW: 12.5 % (ref 11–15)
RED BLOOD CELL COUNT: 4.47 MILLION/UL (ref 4.2–5.8)
SPECIFIC GRAVITY: 1.02 (ref 1–1.03)
TSH: 1.64 MIU/L (ref 0.4–4.5)
WHITE BLOOD CELL COUNT: 1.3 THOUSAND/UL (ref 3.8–10.8)

## 2022-09-08 NOTE — TELEPHONE ENCOUNTER
Message sent to THE [facility] support to assist with changing patients address in quest.

## 2022-09-16 ENCOUNTER — TELEPHONE (OUTPATIENT)
Dept: NEUROLOGY | Facility: CLINIC | Age: 42
End: 2022-09-16

## 2022-10-11 LAB
ABSOLUTE BASOPHILS: 40 CELLS/UL (ref 0–200)
ABSOLUTE EOSINOPHILS: 130 CELLS/UL (ref 15–500)
ABSOLUTE LYMPHOCYTES: 1705 CELLS/UL (ref 850–3900)
ABSOLUTE MONOCYTES: 360 CELLS/UL (ref 200–950)
ABSOLUTE NEUTROPHILS: 2765 CELLS/UL (ref 1500–7800)
ALT: 27 U/L (ref 9–46)
APPEARANCE: CLEAR
AST: 28 U/L (ref 10–40)
BASOPHILS: 0.8 %
BILIRUBIN, TOTAL: 0.6 MG/DL (ref 0.2–1.2)
BILIRUBIN: NEGATIVE
COLOR: YELLOW
CREATININE: 0.86 MG/DL (ref 0.6–1.29)
EGFR: 112 ML/MIN/1.73M2
EOSINOPHILS: 2.6 %
GLUCOSE: NEGATIVE
HEMATOCRIT: 40.8 % (ref 38.5–50)
HEMOGLOBIN: 13.3 G/DL (ref 13.2–17.1)
KETONES: NEGATIVE
LEUKOCYTE ESTERASE: NEGATIVE
LYMPHOCYTES: 34.1 %
MCH: 29.1 PG (ref 27–33)
MCHC: 32.6 G/DL (ref 32–36)
MCV: 89.3 FL (ref 80–100)
MONOCYTES: 7.2 %
MPV: 10 FL (ref 7.5–12.5)
NEUTROPHILS: 55.3 %
NITRITE: NEGATIVE
OCCULT BLOOD: NEGATIVE
PH: 7 (ref 5–8)
PLATELET COUNT: 325 THOUSAND/UL (ref 140–400)
PROTEIN: NEGATIVE
RDW: 13.2 % (ref 11–15)
RED BLOOD CELL COUNT: 4.57 MILLION/UL (ref 4.2–5.8)
SPECIFIC GRAVITY: 1.01 (ref 1–1.03)
TSH: 1.4 MIU/L (ref 0.4–4.5)
WHITE BLOOD CELL COUNT: 5 THOUSAND/UL (ref 3.8–10.8)

## 2022-10-19 ENCOUNTER — TELEPHONE (OUTPATIENT)
Dept: NEUROLOGY | Facility: CLINIC | Age: 42
End: 2022-10-19

## 2022-10-19 NOTE — TELEPHONE ENCOUNTER
Patient presented to office with Parking Placard form for permanent disability. Would like our office to fill out and submit by mail to . Forms placed in nurse basket. Please advise.

## 2022-10-19 NOTE — TELEPHONE ENCOUNTER
Disability parking placard initiated for patient. This is a renewal form. Patient with MS. Form initiated and placed on provider desk for signature and review. Will mail to  office when completed.

## 2022-11-08 ENCOUNTER — TELEPHONE (OUTPATIENT)
Dept: NEUROLOGY | Facility: CLINIC | Age: 42
End: 2022-11-08

## 2022-11-08 ENCOUNTER — OFFICE VISIT (OUTPATIENT)
Dept: NEUROLOGY | Facility: CLINIC | Age: 42
End: 2022-11-08
Payer: COMMERCIAL

## 2022-11-08 VITALS
DIASTOLIC BLOOD PRESSURE: 68 MMHG | BODY MASS INDEX: 23.8 KG/M2 | HEIGHT: 71 IN | RESPIRATION RATE: 16 BRPM | HEART RATE: 78 BPM | SYSTOLIC BLOOD PRESSURE: 130 MMHG | WEIGHT: 170 LBS

## 2022-11-08 DIAGNOSIS — G35 MULTIPLE SCLEROSIS (HCC): Primary | ICD-10-CM

## 2022-11-08 DIAGNOSIS — G35 MULTIPLE SCLEROSIS (HCC): ICD-10-CM

## 2022-11-08 PROCEDURE — 3075F SYST BP GE 130 - 139MM HG: CPT | Performed by: OTHER

## 2022-11-08 PROCEDURE — 99214 OFFICE O/P EST MOD 30 MIN: CPT | Performed by: OTHER

## 2022-11-08 PROCEDURE — 3078F DIAST BP <80 MM HG: CPT | Performed by: OTHER

## 2022-11-08 PROCEDURE — 96365 THER/PROPH/DIAG IV INF INIT: CPT | Performed by: OTHER

## 2022-11-08 PROCEDURE — 3008F BODY MASS INDEX DOCD: CPT | Performed by: OTHER

## 2022-11-08 RX ORDER — METHYLPREDNISOLONE SODIUM SUCCINATE 500 MG/8ML
500 INJECTION INTRAMUSCULAR; INTRAVENOUS DAILY
Status: COMPLETED | OUTPATIENT
Start: 2022-11-09 | End: 2022-11-11

## 2022-11-08 RX ORDER — PREDNISONE 20 MG/1
TABLET ORAL
Qty: 30 TABLET | Refills: 0 | Status: SHIPPED | OUTPATIENT
Start: 2022-11-08

## 2022-11-08 RX ORDER — METHYLPREDNISOLONE SODIUM SUCCINATE 500 MG/8ML
1000 INJECTION INTRAMUSCULAR; INTRAVENOUS ONCE
Status: COMPLETED | OUTPATIENT
Start: 2022-11-08 | End: 2022-11-08

## 2022-11-08 RX ORDER — BACLOFEN 10 MG/1
10 TABLET ORAL DAILY
COMMUNITY

## 2022-11-08 RX ORDER — ZOLPIDEM TARTRATE 10 MG/1
10 TABLET ORAL NIGHTLY PRN
Qty: 30 TABLET | Refills: 0 | Status: CANCELLED | OUTPATIENT
Start: 2022-11-08

## 2022-11-08 RX ADMIN — METHYLPREDNISOLONE SODIUM SUCCINATE 1000 MG: 500 INJECTION INTRAMUSCULAR; INTRAVENOUS at 13:45:00

## 2022-11-08 NOTE — TELEPHONE ENCOUNTER
Called [facility] and spoke with [place] CPT code Solumedrol in office no authorization required for . Call reference #[id number]. CPT code  in office no authorization required . Call #[id number] from [facility]. No predetermination or exclusion on plan for both CPT code.

## 2022-11-08 NOTE — PROGRESS NOTES
IV started per [facility] RN into right antecubital site and Solu-Medrol infused over 20 minutes without difficulty. IV discontinued per RN. Patient tolerated infusion well with no complications. Patient informed that he needs 3 additional days of solumedrol 500 mg daily. Requested to have refill of Ambien d/t steroid infusions.

## 2022-11-08 NOTE — TELEPHONE ENCOUNTER
Patient to have 3 additional days of solumedrol 500 mg daily. Referral order for insurance order placed. Orders placed for 3 additional infusions. Steroid taper has been sent to pharmacy by provider.

## 2022-11-09 ENCOUNTER — NURSE ONLY (OUTPATIENT)
Dept: NEUROLOGY | Facility: CLINIC | Age: 42
End: 2022-11-09
Payer: COMMERCIAL

## 2022-11-09 VITALS — DIASTOLIC BLOOD PRESSURE: 60 MMHG | SYSTOLIC BLOOD PRESSURE: 120 MMHG | RESPIRATION RATE: 16 BRPM | HEART RATE: 78 BPM

## 2022-11-09 DIAGNOSIS — G35 MULTIPLE SCLEROSIS (HCC): ICD-10-CM

## 2022-11-09 LAB
ABSOLUTE BASOPHILS: 48 CELLS/UL (ref 0–200)
ABSOLUTE EOSINOPHILS: 360 CELLS/UL (ref 15–500)
ABSOLUTE LYMPHOCYTES: 1546 CELLS/UL (ref 850–3900)
ABSOLUTE MONOCYTES: 379 CELLS/UL (ref 200–950)
ABSOLUTE NEUTROPHILS: 2467 CELLS/UL (ref 1500–7800)
ALT: 19 U/L (ref 9–46)
APPEARANCE: CLEAR
AST: 19 U/L (ref 10–40)
BASOPHILS: 1 %
BILIRUBIN, TOTAL: 0.5 MG/DL (ref 0.2–1.2)
BILIRUBIN: NEGATIVE
COLOR: YELLOW
CREATININE: 0.73 MG/DL (ref 0.6–1.29)
EGFR: 117 ML/MIN/1.73M2
EOSINOPHILS: 7.5 %
GLUCOSE: NEGATIVE
HEMATOCRIT: 40.1 % (ref 38.5–50)
HEMOGLOBIN: 12.9 G/DL (ref 13.2–17.1)
KETONES: NEGATIVE
LEUKOCYTE ESTERASE: NEGATIVE
LYMPHOCYTES: 32.2 %
MCH: 29 PG (ref 27–33)
MCHC: 32.2 G/DL (ref 32–36)
MCV: 90.1 FL (ref 80–100)
MONOCYTES: 7.9 %
MPV: 10 FL (ref 7.5–12.5)
NEUTROPHILS: 51.4 %
NITRITE: NEGATIVE
PH: 6 (ref 5–8)
PLATELET COUNT: 300 THOUSAND/UL (ref 140–400)
PROTEIN: NEGATIVE
RDW: 12.6 % (ref 11–15)
RED BLOOD CELL COUNT: 4.45 MILLION/UL (ref 4.2–5.8)
SPECIFIC GRAVITY: 1.01 (ref 1–1.03)
TSH: 1.84 MIU/L (ref 0.4–4.5)
WHITE BLOOD CELL COUNT: 4.8 THOUSAND/UL (ref 3.8–10.8)

## 2022-11-09 RX ORDER — ZOLPIDEM TARTRATE 10 MG/1
10 TABLET ORAL NIGHTLY PRN
Qty: 30 TABLET | Refills: 0 | Status: SHIPPED | OUTPATIENT
Start: 2022-11-09

## 2022-11-09 RX ADMIN — METHYLPREDNISOLONE SODIUM SUCCINATE 500 MG: 500 INJECTION INTRAMUSCULAR; INTRAVENOUS at 10:52:00

## 2022-11-09 NOTE — TELEPHONE ENCOUNTER
Patient requested a refill of his Zolpidem tablets since he is doing 4 days of solumedrol.     Medication pended for provider approval.

## 2022-11-10 ENCOUNTER — NURSE ONLY (OUTPATIENT)
Dept: NEUROLOGY | Facility: CLINIC | Age: 42
End: 2022-11-10
Payer: COMMERCIAL

## 2022-11-10 VITALS — RESPIRATION RATE: 16 BRPM | HEART RATE: 76 BPM | DIASTOLIC BLOOD PRESSURE: 70 MMHG | SYSTOLIC BLOOD PRESSURE: 110 MMHG

## 2022-11-10 DIAGNOSIS — G35 MULTIPLE SCLEROSIS (HCC): ICD-10-CM

## 2022-11-10 RX ADMIN — METHYLPREDNISOLONE SODIUM SUCCINATE 500 MG: 500 INJECTION INTRAMUSCULAR; INTRAVENOUS at 13:48:00

## 2022-11-10 NOTE — PROGRESS NOTES
IV started per Rosaura Arredondo RN into right antecubital site and Solu-Medrol infused over 20 minutes without difficulty. IV discontinued per RN. Patient tolerated infusion well with no complications. Patient to complete one additional infusion tomorrow. Patient scheduled for 10:30 tomorrow.

## 2022-11-11 ENCOUNTER — NURSE ONLY (OUTPATIENT)
Dept: NEUROLOGY | Facility: CLINIC | Age: 42
End: 2022-11-11
Payer: COMMERCIAL

## 2022-11-11 VITALS
RESPIRATION RATE: 18 BRPM | OXYGEN SATURATION: 98 % | BODY MASS INDEX: 24 KG/M2 | WEIGHT: 170 LBS | DIASTOLIC BLOOD PRESSURE: 72 MMHG | SYSTOLIC BLOOD PRESSURE: 114 MMHG | HEART RATE: 100 BPM

## 2022-11-11 DIAGNOSIS — G35 MULTIPLE SCLEROSIS (HCC): ICD-10-CM

## 2022-11-11 RX ADMIN — METHYLPREDNISOLONE SODIUM SUCCINATE 500 MG: 500 INJECTION INTRAMUSCULAR; INTRAVENOUS at 10:56:00

## 2022-11-11 NOTE — PROGRESS NOTES
IV started per Magno Peterson RN and Solu-Medrol infused over 20 minutes without difficulty. IV discontinued per RN. Patient tolerated infusion well with no complications. Patient feeling better physically, but is very tired as he has not been sleeping well. Did  [facility] yesterday. Did  oral taper PREDNISONE yesterday and will start taking tomorrow.

## 2022-11-14 RX ORDER — ZOLPIDEM TARTRATE 10 MG/1
TABLET ORAL
Qty: 30 TABLET | Refills: 0 | OUTPATIENT
Start: 2022-11-14

## 2022-11-21 DIAGNOSIS — G35 MULTIPLE SCLEROSIS (HCC): ICD-10-CM

## 2022-11-21 DIAGNOSIS — G04.91 MYELITIS (HCC): ICD-10-CM

## 2022-11-21 RX ORDER — TRAMADOL HYDROCHLORIDE 50 MG/1
TABLET ORAL
Qty: 60 TABLET | Refills: 1 | Status: SHIPPED | OUTPATIENT
Start: 2022-11-21

## 2022-12-16 LAB
ABSOLUTE BASOPHILS: 21 CELLS/UL (ref 0–200)
ABSOLUTE EOSINOPHILS: 50 CELLS/UL (ref 15–500)
ABSOLUTE LYMPHOCYTES: 1231 CELLS/UL (ref 850–3900)
ABSOLUTE MONOCYTES: 344 CELLS/UL (ref 200–950)
ABSOLUTE NEUTROPHILS: 2554 CELLS/UL (ref 1500–7800)
ALT: 13 U/L (ref 9–46)
APPEARANCE: CLEAR
AST: 17 U/L (ref 10–40)
BASOPHILS: 0.5 %
BILIRUBIN, TOTAL: 0.3 MG/DL (ref 0.2–1.2)
BILIRUBIN: NEGATIVE
COLOR: YELLOW
CREATININE: 0.76 MG/DL (ref 0.6–1.29)
EGFR: 116 ML/MIN/1.73M2
EOSINOPHILS: 1.2 %
GLUCOSE: NEGATIVE
HEMATOCRIT: 41.7 % (ref 38.5–50)
HEMOGLOBIN: 14.2 G/DL (ref 13.2–17.1)
KETONES: NEGATIVE
LEUKOCYTE ESTERASE: NEGATIVE
LYMPHOCYTES: 29.3 %
MCH: 29.8 PG (ref 27–33)
MCHC: 34.1 G/DL (ref 32–36)
MCV: 87.6 FL (ref 80–100)
MONOCYTES: 8.2 %
MPV: 10.8 FL (ref 7.5–12.5)
NEUTROPHILS: 60.8 %
NITRITE: NEGATIVE
PH: 7.5 (ref 5–8)
PLATELET COUNT: 299 THOUSAND/UL (ref 140–400)
PROTEIN: NEGATIVE
RDW: 12 % (ref 11–15)
RED BLOOD CELL COUNT: 4.76 MILLION/UL (ref 4.2–5.8)
SPECIFIC GRAVITY: 1.01 (ref 1–1.03)
TSH: 1.56 MIU/L (ref 0.4–4.5)
WHITE BLOOD CELL COUNT: 4.2 THOUSAND/UL (ref 3.8–10.8)

## 2022-12-21 LAB
AQP4 RECEPTOR IGG SERPL QL: NEGATIVE
MOG AB CBA, SERUM: NEGATIVE

## 2022-12-21 RX ORDER — BACLOFEN 10 MG/1
TABLET ORAL
Qty: 180 TABLET | Refills: 1 | Status: SHIPPED | OUTPATIENT
Start: 2022-12-21

## 2022-12-30 ENCOUNTER — TELEPHONE (OUTPATIENT)
Dept: NEUROLOGY | Facility: CLINIC | Age: 42
End: 2022-12-30

## 2022-12-30 NOTE — TELEPHONE ENCOUNTER
Per Dr. 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 This atrophy can cause his symptoms to get worse. RN discussed he probably needs to look into a possible electric wheel chair in the near future. Patient verbalized understanding and did not have any further questions.

## 2023-01-05 ENCOUNTER — OFFICE VISIT (OUTPATIENT)
Dept: NEUROLOGY | Facility: CLINIC | Age: 43
End: 2023-01-05
Payer: COMMERCIAL

## 2023-01-05 VITALS
HEART RATE: 71 BPM | BODY MASS INDEX: 23.8 KG/M2 | WEIGHT: 170 LBS | SYSTOLIC BLOOD PRESSURE: 125 MMHG | OXYGEN SATURATION: 98 % | DIASTOLIC BLOOD PRESSURE: 70 MMHG | HEIGHT: 71 IN | RESPIRATION RATE: 16 BRPM

## 2023-01-05 DIAGNOSIS — G04.91 MYELITIS (HCC): Primary | ICD-10-CM

## 2023-01-05 DIAGNOSIS — G35 MULTIPLE SCLEROSIS (HCC): ICD-10-CM

## 2023-01-05 PROCEDURE — 3078F DIAST BP <80 MM HG: CPT | Performed by: OTHER

## 2023-01-05 PROCEDURE — 3008F BODY MASS INDEX DOCD: CPT | Performed by: OTHER

## 2023-01-05 PROCEDURE — 99214 OFFICE O/P EST MOD 30 MIN: CPT | Performed by: OTHER

## 2023-01-05 PROCEDURE — 3074F SYST BP LT 130 MM HG: CPT | Performed by: OTHER

## 2023-02-04 DIAGNOSIS — G04.91 MYELITIS (HCC): ICD-10-CM

## 2023-02-04 DIAGNOSIS — G35 MULTIPLE SCLEROSIS (HCC): ICD-10-CM

## 2023-02-06 RX ORDER — TRAMADOL HYDROCHLORIDE 50 MG/1
50 TABLET ORAL 2 TIMES DAILY PRN
Qty: 60 TABLET | Refills: 5 | Status: SHIPPED | OUTPATIENT
Start: 2023-02-06

## 2023-02-14 ENCOUNTER — TELEPHONE (OUTPATIENT)
Dept: NEUROLOGY | Facility: CLINIC | Age: 43
End: 2023-02-14

## 2023-02-14 NOTE — TELEPHONE ENCOUNTER
Received fax from [facility] requesting a prior authorization for continued use of generic ampyra. Per Epic review, patient no longer taking. Verified with patient that he is no longer taking medication. Will discontinue authorization at this time.

## 2023-02-15 LAB
ABSOLUTE BASOPHILS: 30 CELLS/UL (ref 0–200)
ABSOLUTE EOSINOPHILS: 110 CELLS/UL (ref 15–500)
ABSOLUTE LYMPHOCYTES: 2310 CELLS/UL (ref 850–3900)
ABSOLUTE MONOCYTES: 330 CELLS/UL (ref 200–950)
ABSOLUTE NEUTROPHILS: 2220 CELLS/UL (ref 1500–7800)
ALT: 33 U/L (ref 9–46)
APPEARANCE: CLEAR
AST: 24 U/L (ref 10–40)
BASOPHILS: 0.6 %
BILIRUBIN, TOTAL: 0.7 MG/DL (ref 0.2–1.2)
BILIRUBIN: NEGATIVE
COLOR: YELLOW
CREATININE: 0.81 MG/DL (ref 0.6–1.29)
EGFR: 114 ML/MIN/1.73M2
EOSINOPHILS: 2.2 %
GLUCOSE: NEGATIVE
HEMATOCRIT: 40.8 % (ref 38.5–50)
HEMOGLOBIN: 13.5 G/DL (ref 13.2–17.1)
KETONES: NEGATIVE
LEUKOCYTE ESTERASE: NEGATIVE
LYMPHOCYTES: 46.2 %
MCH: 29.2 PG (ref 27–33)
MCHC: 33.1 G/DL (ref 32–36)
MCV: 88.3 FL (ref 80–100)
MONOCYTES: 6.6 %
MPV: 10.2 FL (ref 7.5–12.5)
NEUTROPHILS: 44.4 %
NITRITE: NEGATIVE
PH: 6 (ref 5–8)
PLATELET COUNT: 339 THOUSAND/UL (ref 140–400)
PROTEIN: NEGATIVE
RDW: 12.8 % (ref 11–15)
RED BLOOD CELL COUNT: 4.62 MILLION/UL (ref 4.2–5.8)
SPECIFIC GRAVITY: 1.01 (ref 1–1.03)
TSH: 2.08 MIU/L (ref 0.4–4.5)
WHITE BLOOD CELL COUNT: 5 THOUSAND/UL (ref 3.8–10.8)

## 2023-02-23 RX ORDER — BACLOFEN 10 MG/1
5 TABLET ORAL 2 TIMES DAILY PRN
Qty: 180 TABLET | Refills: 1 | Status: SHIPPED | OUTPATIENT
Start: 2023-02-23

## 2023-03-31 DIAGNOSIS — G04.91 MYELITIS (HCC): ICD-10-CM

## 2023-03-31 DIAGNOSIS — G35 MULTIPLE SCLEROSIS (HCC): ICD-10-CM

## 2023-03-31 NOTE — TELEPHONE ENCOUNTER
Pt stated he is leaving for [facility] tomorrow 4/1/23 and hoping to  tramadol a day early. Pharmacy:  [facility]    Call pt to advise.

## 2023-04-03 RX ORDER — TRAMADOL HYDROCHLORIDE 50 MG/1
50 TABLET ORAL 2 TIMES DAILY PRN
Qty: 60 TABLET | Refills: 5 | Status: SHIPPED | OUTPATIENT
Start: 2023-04-03

## 2023-05-02 ENCOUNTER — OFFICE VISIT (OUTPATIENT)
Dept: NEUROLOGY | Facility: CLINIC | Age: 43
End: 2023-05-02
Payer: COMMERCIAL

## 2023-05-02 VITALS
DIASTOLIC BLOOD PRESSURE: 64 MMHG | RESPIRATION RATE: 16 BRPM | SYSTOLIC BLOOD PRESSURE: 114 MMHG | BODY MASS INDEX: 23.8 KG/M2 | HEIGHT: 71 IN | HEART RATE: 69 BPM | WEIGHT: 170 LBS

## 2023-05-02 DIAGNOSIS — G04.91 MYELITIS (HCC): Primary | ICD-10-CM

## 2023-05-02 DIAGNOSIS — G35 MULTIPLE SCLEROSIS (HCC): ICD-10-CM
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
## 2023-05-02 DIAGNOSIS — R29.898 BILATERAL LEG WEAKNESS: ICD-10-CM

## 2023-05-02 LAB
ABSOLUTE BASOPHILS: 50 CELLS/UL (ref 0–200)
ABSOLUTE EOSINOPHILS: 133 CELLS/UL (ref 15–500)
ABSOLUTE LYMPHOCYTES: 3801 CELLS/UL (ref 850–3900)
ABSOLUTE MONOCYTES: 573 CELLS/UL (ref 200–950)
ABSOLUTE NEUTROPHILS: 3743 CELLS/UL (ref 1500–7800)
ALT: 41 U/L (ref 9–46)
APPEARANCE: CLEAR
AST: 32 U/L (ref 10–40)
BASOPHILS: 0.6 %
BILIRUBIN, TOTAL: 0.5 MG/DL (ref 0.2–1.2)
BILIRUBIN: NEGATIVE
COLOR: YELLOW
CREATININE: 0.86 MG/DL (ref 0.6–1.29)
EGFR: 112 ML/MIN/1.73M2
EOSINOPHILS: 1.6 %
GLUCOSE: NEGATIVE
HEMATOCRIT: 40.8 % (ref 38.5–50)
HEMOGLOBIN: 13.6 G/DL (ref 13.2–17.1)
KETONES: NEGATIVE
LEUKOCYTE ESTERASE: NEGATIVE
LYMPHOCYTES: 45.8 %
MCH: 29.5 PG (ref 27–33)
MCHC: 33.3 G/DL (ref 32–36)
MCV: 88.5 FL (ref 80–100)
MONOCYTES: 6.9 %
MPV: 10.9 FL (ref 7.5–12.5)
NEUTROPHILS: 45.1 %
NITRITE: NEGATIVE
OCCULT BLOOD: NEGATIVE
PH: 7 (ref 5–8)
PLATELET COUNT: 293 THOUSAND/UL (ref 140–400)
PROTEIN: NEGATIVE
RDW: 11.8 % (ref 11–15)
RED BLOOD CELL COUNT: 4.61 MILLION/UL (ref 4.2–5.8)
SPECIFIC GRAVITY: 1.01 (ref 1–1.03)
TSH: 1.52 MIU/L (ref 0.4–4.5)
WHITE BLOOD CELL COUNT: 8.3 THOUSAND/UL (ref 3.8–10.8)

## 2023-05-02 PROCEDURE — 99214 OFFICE O/P EST MOD 30 MIN: CPT | Performed by: OTHER

## 2023-05-02 PROCEDURE — 3008F BODY MASS INDEX DOCD: CPT | Performed by: OTHER

## 2023-05-02 PROCEDURE — 3078F DIAST BP <80 MM HG: CPT | Performed by: OTHER

## 2023-05-02 PROCEDURE — 3074F SYST BP LT 130 MM HG: CPT | Performed by: OTHER

## 2023-05-02 RX ORDER — OXYBUTYNIN CHLORIDE 10 MG/1
10 TABLET, EXTENDED RELEASE ORAL DAILY
Qty: 30 TABLET | Refills: 5 | Status: SHIPPED | OUTPATIENT
Start: 2023-05-02

## 2023-05-02 RX ORDER — DEXTROAMPHETAMINE SACCHARATE, AMPHETAMINE ASPARTATE MONOHYDRATE, DEXTROAMPHETAMINE SULFATE AND AMPHETAMINE SULFATE 5; 5; 5; 5 MG/1; MG/1; MG/1; MG/1
20 CAPSULE, EXTENDED RELEASE ORAL EVERY MORNING
Qty: 30 CAPSULE | Refills: 0 | Status: SHIPPED | OUTPATIENT
Start: 2023-05-02

## 2023-05-03 ENCOUNTER — TELEPHONE (OUTPATIENT)
Dept: NEUROLOGY | Facility: CLINIC | Age: 43
End: 2023-05-03

## 2023-05-03 NOTE — TELEPHONE ENCOUNTER
Responded to patient, asked him to give us the contact information when scheduled with physiatry. LM for patient to call this office to discuss as well.

## 2023-05-03 NOTE — TELEPHONE ENCOUNTER
Pt it thinking of getting a electric scooter and he would like to know if Dr. Jose Vivas can wrote him a letter stating his condition and why he needs to travel with it   Thanks

## 2023-06-22 ENCOUNTER — TELEPHONE (OUTPATIENT)
Dept: NEUROLOGY | Facility: CLINIC | Age: 43
End: 2023-06-22

## 2023-07-17 DIAGNOSIS — G04.91 MYELITIS (HCC): ICD-10-CM

## 2023-07-17 DIAGNOSIS — G35 MULTIPLE SCLEROSIS (HCC): ICD-10-CM

## 2023-07-17 RX ORDER — TRAMADOL HYDROCHLORIDE 50 MG/1
50 TABLET ORAL 2 TIMES DAILY PRN
Qty: 60 TABLET | Refills: 2 | Status: SHIPPED | OUTPATIENT
Start: 2023-07-17

## 2023-07-17 NOTE — TELEPHONE ENCOUNTER
Medication: traMADol 50 MG      Date of last refill: 4/3/23 (#60/5R)  [place] 4/84/[place]     Last office visit: 5/2/23  Due back to clinic per last office note:  3MON  Date next office visit scheduled:    Future Appointments   Date Time Provider Kika Torres   8/14/2023  2:20 PM MD MACHELLE EscobedoIWTRINI [facility] [place]           Last OV note recommendation:    Problem/s Identified this visit:   Myelitis:  entire cord, nodular pattern  (primary encounter diagnosis)  Multiple sclerosis ([facility] [address]): supra and infratentorial        Discussion plus Diagnostics & Treatment Orders:  Enoch Ivy  Refer to [facility] for collapsing legs, evaluate for braces? Motorized portable scooter to move around efficient  Adderral for fatigue 20 mg sustained release in AM     Orders Placed This Encounter      Amphetamine-Dextroamphet ER (ADDERALL XR) 20 MG Oral Capsule SR 24 Hr          Sig: Take 1 capsule (20 mg total) by mouth every morning. Dispense:  30 capsule          Refill:  0      oxybutynin ER 10 MG Oral Tablet 24 Hr          Sig: Take 1 tablet (10 mg total) by mouth daily.           Dispense:  30 tablet          Refill:  5

## 2023-08-14 ENCOUNTER — OFFICE VISIT (OUTPATIENT)
Dept: NEUROLOGY | Facility: CLINIC | Age: 43
End: 2023-08-14
Payer: COMMERCIAL

## 2023-08-14 ENCOUNTER — TELEPHONE (OUTPATIENT)
Dept: NEUROLOGY | Facility: CLINIC | Age: 43
End: 2023-08-14

## 2023-08-14 VITALS
WEIGHT: 170 LBS | SYSTOLIC BLOOD PRESSURE: 146 MMHG | HEART RATE: 88 BPM | RESPIRATION RATE: 16 BRPM | DIASTOLIC BLOOD PRESSURE: 78 MMHG | HEIGHT: 71 IN | BODY MASS INDEX: 23.8 KG/M2

## 2023-08-14 DIAGNOSIS — G04.91 MYELITIS (HCC): ICD-10-CM

## 2023-08-14 DIAGNOSIS — G35 MULTIPLE SCLEROSIS (HCC): ICD-10-CM

## 2023-08-14 DIAGNOSIS — G35 MULTIPLE SCLEROSIS (HCC): Primary | ICD-10-CM

## 2023-08-14 PROCEDURE — 3077F SYST BP >= 140 MM HG: CPT | Performed by: OTHER

## 2023-08-14 PROCEDURE — 99214 OFFICE O/P EST MOD 30 MIN: CPT | Performed by: OTHER

## 2023-08-14 PROCEDURE — 3078F DIAST BP <80 MM HG: CPT | Performed by: OTHER

## 2023-08-14 PROCEDURE — 3008F BODY MASS INDEX DOCD: CPT | Performed by: OTHER

## 2023-08-14 RX ORDER — ACYCLOVIR 200 MG/1
CAPSULE ORAL
Qty: 60 CAPSULE | Refills: 5 | Status: SHIPPED | OUTPATIENT
Start: 2023-08-14

## 2023-08-14 RX ORDER — TIZANIDINE 4 MG/1
TABLET ORAL 2 TIMES DAILY PRN
COMMUNITY

## 2023-08-14 RX ORDER — NABUMETONE 750 MG/1
750 TABLET, FILM COATED ORAL 2 TIMES DAILY PRN
Qty: 60 TABLET | Refills: 5 | Status: SHIPPED | OUTPATIENT
Start: 2023-08-14

## 2023-08-14 RX ORDER — TRAMADOL HYDROCHLORIDE 50 MG/1
50 TABLET ORAL 2 TIMES DAILY PRN
Qty: 60 TABLET | Refills: 5 | Status: SHIPPED | OUTPATIENT
Start: 2023-08-14

## 2023-08-15 RX ORDER — PREDNISONE 10 MG/1
TABLET ORAL
Qty: 5 TABLET | Refills: 0 | Status: SHIPPED | OUTPATIENT
Start: 2023-08-15

## 2023-08-15 RX ORDER — CETIRIZINE HYDROCHLORIDE 10 MG/1
TABLET ORAL
Qty: 14 TABLET | Refills: 0 | Status: SHIPPED | OUTPATIENT
Start: 2023-08-15

## 2023-08-15 RX ORDER — FAMOTIDINE 20 MG/1
TABLET, FILM COATED ORAL
Qty: 28 TABLET | Refills: 0 | Status: SHIPPED | OUTPATIENT
Start: 2023-08-15

## 2023-08-15 RX ORDER — PREDNISONE 20 MG/1
TABLET ORAL
Qty: 30 TABLET | Refills: 0 | Status: SHIPPED | OUTPATIENT
Start: 2023-08-24

## 2023-08-15 NOTE — TELEPHONE ENCOUNTER
Pre medications for round 2 Lemtrada:    Zyrtec 10 mg daily po for 14 days, start day prior to infusions TBD    Pepcid 20 mg twice daily po for 14 days, start day prior to infusions TBD    Prednisone 50 mg once on day prior to infusions TBD    Acyclovir 200 mg twice daily for about 6  months or longer. Ordered by provider at office visit. Oral steroid taper, start taking day after infusions. Premedications sent to pharmacy for patient. Tentatively scheduled for 8/21/2023.

## 2023-08-15 NOTE — TELEPHONE ENCOUNTER
Will order Pre medications once an infusion date is determined. Patient has not had his baseline labs drawn. Message sent to patient regarding need for labs.

## 2023-08-16 LAB
ABSOLUTE BASOPHILS: 37 CELLS/UL (ref 0–200)
ABSOLUTE EOSINOPHILS: 80 CELLS/UL (ref 15–500)
ABSOLUTE LYMPHOCYTES: 4066 CELLS/UL (ref 850–3900)
ABSOLUTE MONOCYTES: 445 CELLS/UL (ref 200–950)
ABSOLUTE NEUTROPHILS: 2672 CELLS/UL (ref 1500–7800)
ALT: 19 U/L (ref 9–46)
APPEARANCE: CLEAR
AST: 19 U/L (ref 10–40)
BASOPHILS: 0.5 %
BILIRUBIN, TOTAL: 0.6 MG/DL (ref 0.2–1.2)
BILIRUBIN: NEGATIVE
COLOR: YELLOW
CREATININE: 0.88 MG/DL (ref 0.6–1.29)
EGFR: 111 ML/MIN/1.73M2
EOSINOPHILS: 1.1 %
GLUCOSE: NEGATIVE
HEMATOCRIT: 39.5 % (ref 38.5–50)
HEMOGLOBIN: 13.2 G/DL (ref 13.2–17.1)
KETONES: NEGATIVE
LEUKOCYTE ESTERASE: NEGATIVE
LYMPHOCYTES: 55.7 %
MCH: 29.6 PG (ref 27–33)
MCHC: 33.4 G/DL (ref 32–36)
MCV: 88.6 FL (ref 80–100)
MONOCYTES: 6.1 %
MPV: 10.6 FL (ref 7.5–12.5)
NEUTROPHILS: 36.6 %
NITRITE: NEGATIVE
PH: 7 (ref 5–8)
PLATELET COUNT: 307 THOUSAND/UL (ref 140–400)
PROTEIN: NEGATIVE
RDW: 12.2 % (ref 11–15)
RED BLOOD CELL COUNT: 4.46 MILLION/UL (ref 4.2–5.8)
SPECIFIC GRAVITY: 1.01 (ref 1–1.03)
TSH: 1.45 MIU/L (ref 0.4–4.5)
WHITE BLOOD CELL COUNT: 7.3 THOUSAND/UL (ref 3.8–10.8)

## 2023-08-16 NOTE — TELEPHONE ENCOUNTER
Baseline labs received and baseline lab form faxed to [facility] with receipt confirmation.

## 2023-08-17 DIAGNOSIS — G35 MULTIPLE SCLEROSIS (HCC): ICD-10-CM

## 2023-08-17 RX ORDER — ZOLPIDEM TARTRATE 10 MG/1
10 TABLET ORAL NIGHTLY PRN
Qty: 30 TABLET | Refills: 0 | Status: SHIPPED | OUTPATIENT
Start: 2023-08-17

## 2023-08-17 NOTE — TELEPHONE ENCOUNTER
Medication: Zolpidem 10 mg     Date of last refill: 11/09/2022  [place]      Last office visit: 08/14/2023  Due back to clinic per last office note:    Date next office visit scheduled:    Future Appointments   Date Time Provider Kika Torres   2/12/2024 11:40 AM MD ZULEMA Mcdonough [facility]           Last OV note recommendation:    Discussion plus Diagnostics & Treatment Orders:  Start Nabumetone 750 mg BID prn  CPM  For second round of [place] [place] next week           (x) Discussed potential side effects of any treatment relevant to above. Includes explanation of tests as necessary. Return in about 6 months (around 2/14/2024).

## 2023-08-22 ENCOUNTER — MED REC SCAN ONLY (OUTPATIENT)
Dept: NEUROLOGY | Facility: CLINIC | Age: 43
End: 2023-08-22

## 2023-08-22 ENCOUNTER — TELEPHONE (OUTPATIENT)
Dept: NEUROLOGY | Facility: CLINIC | Age: 43
End: 2023-08-22

## 2023-08-22 NOTE — TELEPHONE ENCOUNTER
Patient infused Round 2, day #1 on 8/21/2023 without incident. Premedicated with:    Solumedrol 1000 mg IVPB over one hour  Acetaminophen 1000 mg orally once  Diphenhydramine 50 mg orally once    Education given to patient prior to round 2. Denied signs of infection, recent vaccines or any recent surgery. Patient is taking premedications at home as ordered by provider. Lemtrada administered over 4 hours without adverse reaction. Observed for the post infusion 2 hour period. Next infusion 8/22/2023. Paperwork to [employer] scan dated 8/22/2023.

## 2023-08-22 NOTE — TELEPHONE ENCOUNTER
Fax received from [facility]. Patient infused round 2 day#2 on 8/22/2023. Patient premedicated with:    Acetaminophen 1000 mg orally once  Diphenhydramine 50 mg orally once  Solumedrol 1000 mg IVPB over one hour    Patient arrived for treatment this morning. Stated he does not feel well and did not sleep well last night. Continues to take his home medications as directed. Infused medication over 4 hours and completed w/o adverse reaction. Patient stayed for 2 hour observation period without issue. Next infusion scheduled for 8/23/2023 for day#3. Paperwork to scanning on med rec scan dated 8/22/2023.

## 2023-08-22 NOTE — TELEPHONE ENCOUNTER
Patient to initiate treatment with Lemtrada round 2. Referral order to [address] [place] [address]. Paperwork to scan after infusions initiated 8/21/2023. Sent on [facility] Scan dated 8/22/2023.

## 2023-08-24 NOTE — TELEPHONE ENCOUNTER
Fax received from [address] [place] [address]. Patient infused Lemtrada round 2 Day #3 on 8/23/2023. Patient premedicated with:    Acetaminophen 1000 mg orally once  Diphenhydramine 50 mg orally once  Solumedrol 1000 mg IVPB over 1 hour    Patient completed his infusions without adverse events. Continued complaints of inability to sleep and nausea during the night. Infused medication over 4 hours and completed the observation period uneventful. No further appointments. Patient completed round 2 Lemtrada. Paperwork to scanning under [facility] Scan dated: 8/22/2023.

## 2023-09-08 LAB
ABSOLUTE BASOPHILS: 18 CELLS/UL (ref 0–200)
ABSOLUTE EOSINOPHILS: 0 CELLS/UL (ref 15–500)
ABSOLUTE LYMPHOCYTES: 42 CELLS/UL (ref 850–3900)
ABSOLUTE MONOCYTES: 300 CELLS/UL (ref 200–950)
ABSOLUTE NEUTROPHILS: 5640 CELLS/UL (ref 1500–7800)
ALT: 12 U/L (ref 9–46)
APPEARANCE: CLEAR
AST: 12 U/L (ref 10–40)
BASOPHILS: 0.3 %
BILIRUBIN, TOTAL: 0.7 MG/DL (ref 0.2–1.2)
BILIRUBIN: NEGATIVE
CREATININE: 0.84 MG/DL (ref 0.6–1.29)
EGFR: 112 ML/MIN/1.73M2
EOSINOPHILS: 0 %
GLUCOSE: NEGATIVE
HEMATOCRIT: 38.4 % (ref 38.5–50)
HEMOGLOBIN: 12.5 G/DL (ref 13.2–17.1)
KETONES: NEGATIVE
LEUKOCYTE ESTERASE: NEGATIVE
LYMPHOCYTES: 0.7 %
MCH: 28.9 PG (ref 27–33)
MCHC: 32.6 G/DL (ref 32–36)
MCV: 88.7 FL (ref 80–100)
MONOCYTES: 5 %
MPV: 10.2 FL (ref 7.5–12.5)
NEUTROPHILS: 94 %
NITRITE: NEGATIVE
PH: 7 (ref 5–8)
PLATELET COUNT: 312 THOUSAND/UL (ref 140–400)
PROTEIN: NEGATIVE
RDW: 12.3 % (ref 11–15)
RED BLOOD CELL COUNT: 4.33 MILLION/UL (ref 4.2–5.8)
SPECIFIC GRAVITY: 1.02 (ref 1–1.03)
TSH: 2.05 MIU/L (ref 0.4–4.5)
WHITE BLOOD CELL COUNT: 6 THOUSAND/UL (ref 3.8–10.8)

## 2023-09-13 ENCOUNTER — TELEPHONE (OUTPATIENT)
Dept: NEUROLOGY | Facility: CLINIC | Age: 43
End: 2023-09-13

## 2023-09-13 ENCOUNTER — MED REC SCAN ONLY (OUTPATIENT)
Dept: NEUROLOGY | Facility: CLINIC | Age: 43
End: 2023-09-13

## 2023-10-10 NOTE — PROGRESS NOTES
[facility]  [address]  [facility] [address]      Sandra Bennett :  1962 MRN:  [id number]    This visit was performed via live interactive two-way Video visit with patient's consent.   Clinician Location:Home  Patient Location: Home.  [address]  \[facility] [place]\"" [address]  Verified patient identity:  [x] Yes    Treatment Plan:   Session  of current treatment plan    10/9/2023 Time Visit Began:  4:02 pm  Time Visit Ended:  4:52 pm    Session Type:  Therapy 38-52 minutes (57399)  Others Present:  No     Primary Diagnosis:  Generalized Anxiety Disorder  :  1 Mild     Chief complaint in patient's own words:  \"I've been doing alright.  My mom still isn't talking to me.\"    D:  Patient said she has been doing well and keeping busy. Discussed family disagreement with her brother about paying for her mother's plowing and her past acquiescence and current estrangement from her mother.      She has an issue of fear that she will be tossed to the side.  She said she is worried because his family didn't put a lot of effort into staying connected with him and she did and she is feeling insecure. Discussed the origin of those feelings and new beginnings with .  She is picking him up on Monday.    Suicide/Homicide/Violence Ideation:   No imminent safety concerns identified or reported during current encounter.     Change in Medication(s) Reported:  No     Current Outpatient Medications   Medication Sig   • buPROPion XL (WELLBUTRIN XL) 300 MG 24 hr tablet Take 1 tablet by mouth daily.   • ALPRAZolam (XANAX) 0.25 MG tablet Take 1 tablet by mouth daily as needed for Anxiety.   • PARoxetine (PAXIL) 10 MG tablet Take 1 tablet by mouth daily.   • traZODone (DESYREL) 50 MG tablet Take 1 tablet by mouth nightly as needed for Sleep.   • cephalexin (KEFLEX) 250 MG capsule One capsule by mouth after sexual intercourse.     No current facility-administered  IV started per Raquel Ornelas RN into right [facility] and Solu-Medrol infused over 20 minutes without difficulty. IV discontinued per RN. Patient tolerated infusion well with no complications. medications for this visit.       PHQ 9 Scores  PHQ 9 Score Adult PHQ 9 Score Adult PHQ 9 Interpretation   10/9/2023  10:11 AM 7 Mild Depression   8/10/2023   3:54 PM 16 Moderately Severe Depression   7/25/2023  12:01 PM 10 Moderate Depression   10/18/2021   4:06 PM 17 Moderately Severe Depression        ADILENE 7 Scores  ADILENE 7 Score ADILENE 7 Score   10/9/2023   4:00 PM 3   8/24/2023   4:00 PM 4   8/10/2023   4:00 PM 4   7/25/2023   2:00 PM 8   8/2/2022  11:40 AM 13   10/18/2021   3:40 PM 8        A:  Re-administered the Patient Health Questionnaire - 9 (PHQ-9) and Generalized Anxiety Disorder - 7 (ADILENE-7), and reviewed results with patient. Provided coaching on present moment awareness, grounding tools, defusion tools.    R:  Patient was alert, oriented, receptive to feedback, and engaged in the therapeutic process.  Affect was congruent. She conveyed understanding regarding education and resources provided.     P:  Plan to continue individual follow-up with next appointment scheduled for 10/30/2023.  Patient was encouraged to contact Behavioral Health with any questions or concerns.  Will continue collaboration with patient's primary care provider, Elsa Davis DO, regarding plan of care.     Treatment Plan:  Unchanged    Discharge Plan:  [facility]      Shana Brown LCSW

## 2023-11-14 ENCOUNTER — TELEPHONE (OUTPATIENT)
Dept: NEUROLOGY | Facility: CLINIC | Age: 43
End: 2023-11-14

## 2023-11-14 ENCOUNTER — MED REC SCAN ONLY (OUTPATIENT)
Dept: NEUROLOGY | Facility: CLINIC | Age: 43
End: 2023-11-14

## 2023-11-14 NOTE — TELEPHONE ENCOUNTER
Monthly Lemtrada labs for November received for provider review. Labs not populating in Epic, will send to scanning on med rec scan dated 11/13/2023.

## 2023-11-16 ENCOUNTER — TELEPHONE (OUTPATIENT)
Dept: NEUROLOGY | Facility: CLINIC | Age: 43
End: 2023-11-16

## 2023-11-16 DIAGNOSIS — G35 MULTIPLE SCLEROSIS (HCC): Primary | ICD-10-CM

## 2023-11-16 NOTE — TELEPHONE ENCOUNTER
Patient requesting to have his yearly MRI's ordered to [facility].     Orders pended for provider approval.

## 2024-01-05 ENCOUNTER — TELEPHONE (OUTPATIENT)
Dept: NEUROLOGY | Facility: CLINIC | Age: 44
End: 2024-01-05

## 2024-01-05 NOTE — TELEPHONE ENCOUNTER
Received fax from [facility] approval for MRI Brain CPT 19519 effective January 5, 2024- March 4, 2024.

## 2024-01-11 ENCOUNTER — MED REC SCAN ONLY (OUTPATIENT)
Dept: NEUROLOGY | Facility: CLINIC | Age: 44
End: 2024-01-11

## 2024-01-11 ENCOUNTER — TELEPHONE (OUTPATIENT)
Dept: NEUROLOGY | Facility: CLINIC | Age: 44
End: 2024-01-11

## 2024-01-11 NOTE — TELEPHONE ENCOUNTER
January monthly Lemtrada labs received for provider review.     Monthly labs are not interfacing in Epic, will send to scanning.    Send to scanning on [facility] scan dated 1/11/2024.

## 2024-01-30 ENCOUNTER — TELEPHONE (OUTPATIENT)
Dept: NEUROLOGY | Facility: CLINIC | Age: 44
End: 2024-01-30

## 2024-01-30 DIAGNOSIS — M54.50 ACUTE RIGHT-SIDED LOW BACK PAIN WITHOUT SCIATICA: Primary | ICD-10-CM

## 2024-01-30 DIAGNOSIS — R29.898 RIGHT LEG WEAKNESS: ICD-10-CM

## 2024-01-30 NOTE — TELEPHONE ENCOUNTER
Saw his PCP, no urine infection, was prescribed antibiotics and was told to call us it can be MS related   Please call to advice

## 2024-01-31 ENCOUNTER — TELEPHONE (OUTPATIENT)
Dept: NEUROLOGY | Facility: CLINIC | Age: 44
End: 2024-01-31

## 2024-01-31 DIAGNOSIS — G35 MULTIPLE SCLEROSIS (HCC): Primary | ICD-10-CM

## 2024-01-31 RX ORDER — PREDNISONE 20 MG/1
TABLET ORAL
Qty: 30 TABLET | Refills: 0 | Status: SHIPPED | OUTPATIENT
Start: 2024-02-04 | End: 2024-02-19

## 2024-01-31 NOTE — TELEPHONE ENCOUNTER
Per Joycelyn Mcneill PA-C, patient to be treated for MS flare:    SOLUMEDROL 500 mg IV x 3 days followed by PREDNISONE oral taper.  Patient to infuse two days at [facility] followed by one day at [facility].    Per [facility] [place], no pre-authorization needed.        Oral taper PREDNISONE ordered to local pharmacy.

## 2024-01-31 NOTE — TELEPHONE ENCOUNTER
Patient with weakness in the right leg and pain in the low back . Having spasms in the right leg. No symptoms in the left leg. Will have him proceed with solumedrol 500mg IV daily x 3 days followed by prednisone taper and get MRI Lumbar Spine

## 2024-01-31 NOTE — TELEPHONE ENCOUNTER
Patient states he is having lower back pain with R leg general weakness that started 4 days ago.  States trouble walking, gasps and pauses due to pain.  When asked about urinary issues mentioned in original call, states he has no UTI and it is really his back hurting and the weak leg.  Patient trying to walk during phone call and unable to have conversation due to pain and dog barking.    Discussed OXYBUTYNIN which patient is not taking and denies having on hand. Explained that this was ordered last May to be taken daily for urinary symptoms.  Verbalizes he will  and take medication.    Routed to provider.

## 2024-02-01 ENCOUNTER — NURSE ONLY (OUTPATIENT)
Dept: NEUROLOGY | Facility: CLINIC | Age: 44
End: 2024-02-01
Payer: COMMERCIAL

## 2024-02-01 ENCOUNTER — TELEPHONE (OUTPATIENT)
Dept: HEMATOLOGY/ONCOLOGY | Facility: HOSPITAL | Age: 44
End: 2024-02-01

## 2024-02-01 VITALS — SYSTOLIC BLOOD PRESSURE: 122 MMHG | DIASTOLIC BLOOD PRESSURE: 68 MMHG | HEART RATE: 86 BPM

## 2024-02-01 DIAGNOSIS — G35 MULTIPLE SCLEROSIS (HCC): ICD-10-CM

## 2024-02-01 DIAGNOSIS — G35 MULTIPLE SCLEROSIS (HCC): Primary | ICD-10-CM

## 2024-02-01 PROCEDURE — 96365 THER/PROPH/DIAG IV INF INIT: CPT | Performed by: PHYSICIAN ASSISTANT

## 2024-02-01 RX ORDER — ZOLPIDEM TARTRATE 10 MG/1
10 TABLET ORAL NIGHTLY PRN
Qty: 30 TABLET | Refills: 0 | Status: SHIPPED | OUTPATIENT
Start: 2024-02-01

## 2024-02-01 RX ORDER — METHYLPREDNISOLONE SODIUM SUCCINATE 500 MG/8ML
500 INJECTION INTRAMUSCULAR; INTRAVENOUS DAILY
Status: COMPLETED | OUTPATIENT
Start: 2024-02-01 | End: 2024-02-02

## 2024-02-01 RX ORDER — ZOLPIDEM TARTRATE 10 MG/1
10 TABLET ORAL NIGHTLY PRN
Qty: 30 TABLET | Refills: 0 | Status: CANCELLED | OUTPATIENT
Start: 2024-02-01

## 2024-02-01 RX ADMIN — METHYLPREDNISOLONE SODIUM SUCCINATE 500 MG: 500 INJECTION INTRAMUSCULAR; INTRAVENOUS at 09:26:00

## 2024-02-01 NOTE — TELEPHONE ENCOUNTER
Per Epic review, patient has not been scheduled for infusion on Saturday.    Follow up message sent to the cancer center to schedule patient.

## 2024-02-01 NOTE — TELEPHONE ENCOUNTER
Scheduled patient for Solumedrol 500 mg on Saturday at 0915.  called patient and left a message with this information.

## 2024-02-01 NOTE — PROGRESS NOTES
IV started per Ne [place] into right AC and Solu-Medrol infused over 20 minutes without difficulty. IV discontinued per RN. Patient tolerated infusion well with no complications.

## 2024-02-01 NOTE — TELEPHONE ENCOUNTER
Ej requesting refill of Ambien for steroid-induced insomnia.    Last fill 8/17/23.    Script pended for MD approval.

## 2024-02-02 ENCOUNTER — NURSE ONLY (OUTPATIENT)
Dept: NEUROLOGY | Facility: CLINIC | Age: 44
End: 2024-02-02
Payer: COMMERCIAL

## 2024-02-02 VITALS — DIASTOLIC BLOOD PRESSURE: 68 MMHG | HEART RATE: 75 BPM | SYSTOLIC BLOOD PRESSURE: 112 MMHG

## 2024-02-02 NOTE — PROGRESS NOTES
IV started per [facility] [place] and Solu-Medrol infused over 20 minutes without difficulty. IV discontinued per RN. Patient tolerated infusion well with no complications.    Does not feel much different, is being treated with cream for hemorrhoids as well.  Patient believes his back pain and leg pain may be related to hemorrhoids.

## 2024-02-03 ENCOUNTER — OFFICE VISIT (OUTPATIENT)
Dept: HEMATOLOGY/ONCOLOGY | Facility: HOSPITAL | Age: 44
End: 2024-02-03
Attending: Other
Payer: COMMERCIAL

## 2024-02-03 VITALS
SYSTOLIC BLOOD PRESSURE: 111 MMHG | OXYGEN SATURATION: 98 % | DIASTOLIC BLOOD PRESSURE: 57 MMHG | HEART RATE: 76 BPM | RESPIRATION RATE: 16 BRPM | TEMPERATURE: 98 F

## 2024-02-03 DIAGNOSIS — G35 MULTIPLE SCLEROSIS (HCC): Primary | ICD-10-CM

## 2024-02-03 PROCEDURE — 96365 THER/PROPH/DIAG IV INF INIT: CPT

## 2024-02-03 NOTE — PROGRESS NOTES
Pt here for Solumedrol . Pt denies any issues or concerns.      Ordering MD: Jillian  Order Exp: 2/3/24, order good for 1 dose     Pt tolerated infusion without difficulty or complaint. Reviewed next appt date/time: n/a      Education Record  Learner:  Patient  Disease / Diagnosis: MS  Barriers / Limitations:  None  Method:  Brief focused and Discussion  General Topics:  Medication, Side effects and symptom management, and Plan of care reviewed  Outcome:  Shows understanding    Arrived ambulatory with a cane. Discharged home in stable condition, no new complaints.

## 2024-02-12 ENCOUNTER — OFFICE VISIT (OUTPATIENT)
Dept: NEUROLOGY | Facility: CLINIC | Age: 44
End: 2024-02-12
Payer: COMMERCIAL

## 2024-02-12 VITALS — HEART RATE: 90 BPM | DIASTOLIC BLOOD PRESSURE: 78 MMHG | SYSTOLIC BLOOD PRESSURE: 122 MMHG

## 2024-02-12 DIAGNOSIS — G35 MULTIPLE SCLEROSIS (HCC): ICD-10-CM

## 2024-02-12 DIAGNOSIS — G04.91 MYELITIS (HCC): ICD-10-CM

## 2024-02-12 DIAGNOSIS — B02.9 THIGH SHINGLES: Primary | ICD-10-CM

## 2024-02-12 PROCEDURE — 99214 OFFICE O/P EST MOD 30 MIN: CPT | Performed by: OTHER

## 2024-02-12 PROCEDURE — 3074F SYST BP LT 130 MM HG: CPT | Performed by: OTHER

## 2024-02-12 PROCEDURE — 3078F DIAST BP <80 MM HG: CPT | Performed by: OTHER

## 2024-02-12 RX ORDER — TRAMADOL HYDROCHLORIDE 50 MG/1
50 TABLET ORAL 2 TIMES DAILY PRN
Qty: 60 TABLET | Refills: 5 | Status: CANCELLED | OUTPATIENT
Start: 2024-02-12

## 2024-02-12 RX ORDER — TRAMADOL HYDROCHLORIDE 50 MG/1
50 TABLET ORAL 2 TIMES DAILY PRN
Qty: 60 TABLET | Refills: 5 | Status: SHIPPED | OUTPATIENT
Start: 2024-02-12

## 2024-02-12 NOTE — PROGRESS NOTES
NEUROLOGY  [facility]    Ejan ReyeShaquille  11/3/1980  Primary Care Provider:  EJ PULLIAM    2/12/2024  Accompanied visit:     (x) No.      43 year old yo,  was last seen on:: August 2023    Seen for:  MS     Previous visit and existing record notes reviewed in preparation for the face to face visit.  Relevant labs and studies reviewed and will be noted in relevant areas of this record.      Present condition:  He had low back pain and right posterior leg pain in end of January 2024 and received Steroid IV and shortly had SHINGLES right afterwards    Pain was actually sharp and burning in sensation  initially    Past History update/new problem(s): as above    Review of Systems:  Review of Systems:  Denies systemic symptoms     No CP or SOB.  No GI or  symptoms. Relevant Neuro as noted above.      Medications:      Current Outpatient Medications:     traMADol 50 MG Oral Tab, Take 1 tablet (50 mg total) by mouth 2 (two) times daily as needed., Disp: 60 tablet, Rfl: 5    zolpidem 10 MG Oral Tab, Take 1 tablet (10 mg total) by mouth nightly as needed., Disp: 30 tablet, Rfl: 0    predniSONE 20 MG Oral Tab, Take 3 tablets (60 mg total) by mouth daily for 5 days, THEN 2 tablets (40 mg total) daily for 5 days, THEN 1 tablet (20 mg total) daily for 5 days., Disp: 30 tablet, Rfl: 0    famotidine (PEPCID) 20 MG Oral Tab, Take twice daily for 14 days starting day prior to infusions., Disp: 28 tablet, Rfl: 0    predniSONE 10 MG Oral Tab, Take 50 mg night before infusions., Disp: 5 tablet, Rfl: 0    predniSONE 20 MG Oral Tab, Take 3 tabs daily x 5 days, then 2 tabs daily x 5 days, then 1 tab daily x 5 days then discontinue medication. Start after infusions, Disp: 30 tablet, Rfl: 0    tiZANidine 4 MG Oral Tab, Take 1-2 tablets (4-8 mg total) by mouth 2 (two) times daily as needed., Disp: , Rfl:     nabumetone 750 MG Oral Tab, Take 1 tablet (750 mg total) by mouth 2 (two)  times daily as needed for Pain., Disp: 60 tablet, Rfl: 5    oxybutynin ER 10 MG Oral Tablet 24 Hr, Take 1 tablet (10 mg total) by mouth daily., Disp: 30 tablet, Rfl: 5    alemtuzumab 12 mg/1.2mL Intravenous Solution, Inject into the vein., Disp: , Rfl:     Multiple Vitamin (ONE-DAILY MULTI VITAMINS) Oral Tab, Take 1 tablet by mouth daily., Disp: , Rfl:     cetirizine (ZYRTEC ALLERGY) 10 MG Oral Tab, Take daily for 14 days starting day prior to infusions. (Patient not taking: Reported on 2/12/2024), Disp: 14 tablet, Rfl: 0    acyclovir 200 MG Oral Cap, Take one capsule twice daily starting Friday prior to infusions. Continue taking for at least 6 months. (Patient not taking: Reported on 2/12/2024), Disp: 60 capsule, Rfl: 5  PRN:     Allergies:  No Known Allergies       EXAM:  /78   Pulse 90   Looks stated age  General Exam:  HENT:  pink conjunctiva anicteric sclerae  Neck no adenopathy, thyroid normal  Heart and Lungs:  normal  Extremities: no cyanosis, skin changes    NEURO  Healing shingles lesions right S1   Reset of neuro is unchanged    INTERPRETATION of RELEVANT LABS and other DATA:          Problem/s Identified this visit:   1. Thigh shingles    2. Multiple sclerosis (HCC): supra and infratentorial    3. Myelitis:  entire cord, nodular pattern          Discussion plus Diagnostics & Treatment Orders:  CPM  No need to change anything      (x) Discussed potential side effects of any treatment relevant to above.  Includes explanation of tests as necessary.    Return in about 6 months (around 8/12/2024).      Patient understands that if needed, based on condition and or test results, follow up will be readjusted      Jamie Walsh MD  Vascular & General Neurology  Director, Multiple Sclerosis Program  [facility]  2/12/2024, Time completed 12:23 PM    Decision making:  ( x ) labs reviewed/ordered - 1  (  ) new diagnosis: - 1  ( x) Images & studies independently reviewed -non F2F  (  )  Case/studies discussed with other caregivers - -non F2F  (  ) Telephone time with patiern or authorized [facility] member--non F2F  ( x ) other records reviewed --non F2F including consultations  (  ) [facility] meetings - patient not present --non F2F  (  ) Independent Historian obtained    Non Face to Face CPT code 83618/98658 applies as documented above    PROCEDURE DONE     (   ) see notes        After visit, patient was escorted out and handed-off discharge process and instructions to the check out desk.  No additional issues relevant to visit were raised to staff at this time interval.        This document is to be interpreted as my current opinion regarding the case as of the stated date of service based on the information available to me at this time and may supersedes any prior opinion expressed either orally or in writing.  Services rendered are only within the scope of direct medical care  Sometimes, reports may have been prepared partially using a speech recognition software technology.  If a word or phrase is confusing or out of context, please do not hesitate to call for clarification.

## 2024-02-12 NOTE — PATIENT INSTRUCTIONS
Refill policies:    Allow 2-3 business days for refills; controlled substances may take longer.  Contact your pharmacy at least 5 days prior to running out of medication and have them send an electronic request or submit request through the “request refill” option in your [employer] account.  Refills are not addressed on weekends; covering physicians do not authorize routine medications on weekends.  No narcotics or controlled substances are refilled after noon on Fridays or by on call physicians.  By law, narcotics must be electronically prescribed.  A 30 day supply with no refills is the maximum allowed.  If your prescription is due for a refill, you may be due for a follow up appointment.  To best provide you care, patients receiving routine medications need to be seen at least once a year.  Patients receiving narcotic/controlled substance medications need to be seen at least once every 3 months.  In the event that your preferred pharmacy does not have the requested medication in stock (e.g. Backordered), it is your responsibility to find another pharmacy that has the requested medication available.  We will gladly send a new prescription to that pharmacy at your request.    Scheduling Tests:    If your physician has ordered radiology tests such as MRI or CT scans, please contact Central Scheduling at [phone] right away to schedule the test.  Once scheduled, the [facility] Centralized Referral Team will work with your insurance carrier to obtain pre-certification or prior authorization.  Depending on your insurance carrier, approval may take 3-10 days.  It is highly recommended patients assure they have received an authorization before having a test performed.  If test is done without insurance authorization, patient may be responsible for the entire amount billed.      Precertification and Prior Authorizations:  If your physician has recommended that you have a procedure or additional testing performed the [facility]  Centralized Referral Team will contact your insurance carrier to obtain pre-certification or prior authorization.    You are strongly encouraged to contact your insurance carrier to verify that your procedure/test has been approved and is a COVERED benefit.  Although the [facility] Centralized Referral Team does its due diligence, the insurance carrier gives the disclaimer that \"Although the procedure is authorized, this does not guarantee payment.\"    Ultimately the patient is responsible for payment.   Thank you for your understanding in this matter.  Paperwork Completion:  If you require FMLA or disability paperwork for your recovery, please make sure to either drop it off or have it faxed to our office at [phone]. Be sure the form has your name and date of birth on it.  The form will be faxed to our Forms Department and they will complete it for you.  There is a 25$ fee for all forms that need to be filled out.  Please be aware there is a 10-14 day turnaround time.  You will need to sign a release of information (BELTRAN) form if your paperwork does not come with one.  You may call the Forms Department with any questions at [phone].  Their fax number is [phone].

## 2024-02-12 NOTE — TELEPHONE ENCOUNTER
Medication: Tramadol 50mg     Date of last refill: 08/14/2023 with 5 addt refills  [place]      Last office visit: 02/12/2024  Due back to clinic per last office note:    Date next office visit scheduled:    No future appointments.        Last OV note recommendation:

## 2024-02-29 ENCOUNTER — TELEPHONE (OUTPATIENT)
Dept: NEUROLOGY | Facility: CLINIC | Age: 44
End: 2024-02-29

## 2024-02-29 ENCOUNTER — MED REC SCAN ONLY (OUTPATIENT)
Dept: NEUROLOGY | Facility: CLINIC | Age: 44
End: 2024-02-29

## 2024-02-29 NOTE — TELEPHONE ENCOUNTER
Feb monthly Lemtrada labs received for provider review.    Labs are not interfacing in Epic. Will send to scanning.

## 2024-04-01 NOTE — PROGRESS NOTES
IV started per Yeimi Dunn RN into right [facility] and Solu-Medrol infused over 20 minutes without difficulty. IV discontinued per RN. Patient tolerated infusion well with no complications.
No

## 2024-05-01 ENCOUNTER — MED REC SCAN ONLY (OUTPATIENT)
Dept: NEUROLOGY | Facility: CLINIC | Age: 44
End: 2024-05-01

## 2024-05-01 ENCOUNTER — TELEPHONE (OUTPATIENT)
Dept: NEUROLOGY | Facility: CLINIC | Age: 44
End: 2024-05-01

## 2024-05-01 NOTE — TELEPHONE ENCOUNTER
April monthly Lemtrada results received for provider review. Results are not interfacing in Epic.   Sent to scanning on [facility] scan dated: 5/1/2024.    All labs within normal limits except CBC.    WBC, RBC, H/H all low.

## 2024-06-12 ENCOUNTER — TELEPHONE (OUTPATIENT)
Dept: NEUROLOGY | Facility: CLINIC | Age: 44
End: 2024-06-12

## 2024-06-12 NOTE — TELEPHONE ENCOUNTER
[facility] lab results. Collection date 6/11/24 received. Results not visible in Epic. Copy sent to scan. Hard copy given to provider for review.

## 2024-06-14 ENCOUNTER — OFFICE VISIT (OUTPATIENT)
Dept: NEUROLOGY | Facility: CLINIC | Age: 44
End: 2024-06-14
Payer: COMMERCIAL

## 2024-06-14 ENCOUNTER — TELEPHONE (OUTPATIENT)
Dept: NEUROLOGY | Facility: CLINIC | Age: 44
End: 2024-06-14

## 2024-06-14 VITALS
BODY MASS INDEX: 21.87 KG/M2 | HEART RATE: 87 BPM | WEIGHT: 165 LBS | DIASTOLIC BLOOD PRESSURE: 59 MMHG | SYSTOLIC BLOOD PRESSURE: 113 MMHG | RESPIRATION RATE: 16 BRPM | HEIGHT: 73 IN

## 2024-06-14 DIAGNOSIS — G35 MS (MULTIPLE SCLEROSIS) (HCC): Primary | ICD-10-CM

## 2024-06-14 DIAGNOSIS — G35 MULTIPLE SCLEROSIS (HCC): Primary | ICD-10-CM

## 2024-06-14 DIAGNOSIS — M21.372 LEFT FOOT DROP: ICD-10-CM

## 2024-06-14 PROCEDURE — 3078F DIAST BP <80 MM HG: CPT | Performed by: OTHER

## 2024-06-14 PROCEDURE — 3008F BODY MASS INDEX DOCD: CPT | Performed by: OTHER

## 2024-06-14 PROCEDURE — 3074F SYST BP LT 130 MM HG: CPT | Performed by: OTHER

## 2024-06-14 PROCEDURE — 99214 OFFICE O/P EST MOD 30 MIN: CPT | Performed by: OTHER

## 2024-06-14 NOTE — PROGRESS NOTES
NEUROLOGY  [facility]       Ivan Reyes-Oliver  11/3/1980  Primary Care Provider:  EJ PULLIAM    6/14/2024  43 year old yo,  was last seen on:: Feb 2024    Seen for/plans last visit:  MS    Previous visit and existing record notes reviewed in preparation for the face to face visit.  Relevant labs and studies reviewed and will be noted in relevant areas of this record.  Accompanied visit:     (x) No.      Present condition:  He continues to have problem with his walking and intermittent numbness on one side.  Fatigue is also an issue specially when exposed to heat.    Past History update/new problem(s): As noted above    Review of Systems:  Review of Systems:  Denies systemic symptoms except for extreme fatigue when overheated.     No CP or SOB.  No GI or  symptoms. Relevant Neuro as noted above.      Medications:      Current Outpatient Medications:     traMADol 50 MG Oral Tab, Take 1 tablet (50 mg total) by mouth 2 (two) times daily as needed., Disp: 60 tablet, Rfl: 5    zolpidem 10 MG Oral Tab, Take 1 tablet (10 mg total) by mouth nightly as needed., Disp: 30 tablet, Rfl: 0    famotidine (PEPCID) 20 MG Oral Tab, Take twice daily for 14 days starting day prior to infusions., Disp: 28 tablet, Rfl: 0    predniSONE 10 MG Oral Tab, Take 50 mg night before infusions., Disp: 5 tablet, Rfl: 0    tiZANidine 4 MG Oral Tab, Take 1-2 tablets (4-8 mg total) by mouth 2 (two) times daily as needed., Disp: , Rfl:     nabumetone 750 MG Oral Tab, Take 1 tablet (750 mg total) by mouth 2 (two) times daily as needed for Pain., Disp: 60 tablet, Rfl: 5    oxybutynin ER 10 MG Oral Tablet 24 Hr, Take 1 tablet (10 mg total) by mouth daily., Disp: 30 tablet, Rfl: 5    alemtuzumab 12 mg/1.2mL Intravenous Solution, Inject into the vein., Disp: , Rfl:     Multiple Vitamin (ONE-DAILY MULTI VITAMINS) Oral Tab, Take 1 tablet by mouth daily., Disp: , Rfl:   PRN:     Allergies:  No Known Allergies       EXAM:  /59 (BP  Location: Right arm, Patient Position: Sitting, Cuff Size: adult)   Pulse 87   Resp 16   Ht 73\"   Wt 165 lb (74.8 kg)   BMI 21.77 kg/m²   Looks stated age  General Exam:  HENT:  pink conjunctiva anicteric sclerae  Neck no adenopathy, thyroid normal  Heart and Lungs:  normal  Extremities: no cyanosis, skin changes    NEURO  Same findings especially with his gait.  Left foot drop is noticeable today    INTERPRETATION of RELEVANT LABS and other DATA:          Problem/s Identified this visit:   1. Multiple sclerosis (HCC)    2. Left foot drop          Discussion plus Diagnostics & Treatment Orders:  Referral for WALKAIDE for his left foot drop      (x) Discussed potential side effects of any treatment relevant to above.  Includes explanation of tests as necessary.    No follow-ups on file.      Patient understands that if needed, based on condition and or test results, follow up will be readjusted      Jamie Walsh MD  Vascular & General Neurology  Director, Multiple Sclerosis Program  [facility]  6/14/2024, Time completed 10:44 AM    Decision making:  ( x ) labs reviewed/ordered - 1  (  ) new diagnosis: - 1  ( x) Images & studies independently reviewed -non F2F  (  ) Case/studies discussed with other caregivers - -non F2F  (  ) Telephone time with patiern or authorized Fam member--non F2F  ( x ) other records reviewed --non F2F including consultations  (  ) [facility] meetings - patient not present --non F2F  (  ) Independent Historian obtained    Non Face to Face CPT code 20462/92926 applies as documented above    PROCEDURE DONE     (   ) see notes        After visit, patient was escorted out and handed-off discharge process and instructions to the check out desk.  No additional issues relevant to visit were raised to staff at this time interval.        This document is to be interpreted as my current opinion regarding the case as of the stated date of service based on the information available to  me at this time and may supersedes any prior opinion expressed either orally or in writing.  Services rendered are only within the scope of direct medical care  Sometimes, reports may have been prepared partially using a speech recognition software technology.  If a word or phrase is confusing or out of context, please do not hesitate to call for clarification.

## 2024-06-14 NOTE — TELEPHONE ENCOUNTER
Referral placed for [facility] for evaluation and orders for:    L300 GO FOOT DROP SYSTEM: JANET Wiggins, Ext-RFL, Routine 1 visit    Faxed to [phone] with confirmation fax.

## 2024-06-14 NOTE — PATIENT INSTRUCTIONS
Refill policies:    Allow 2-3 business days for refills; controlled substances may take longer.  Contact your pharmacy at least 5 days prior to running out of medication and have them send an electronic request or submit request through the “request refill” option in your [employer] account.  Refills are not addressed on weekends; covering physicians do not authorize routine medications on weekends.  No narcotics or controlled substances are refilled after noon on Fridays or by on call physicians.  By law, narcotics must be electronically prescribed.  A 30 day supply with no refills is the maximum allowed.  If your prescription is due for a refill, you may be due for a follow up appointment.  To best provide you care, patients receiving routine medications need to be seen at least once a year.  Patients receiving narcotic/controlled substance medications need to be seen at least once every 3 months.  In the event that your preferred pharmacy does not have the requested medication in stock (e.g. Backordered), it is your responsibility to find another pharmacy that has the requested medication available.  We will gladly send a new prescription to that pharmacy at your request.    Scheduling Tests:    If your physician has ordered radiology tests such as MRI or CT scans, please contact Central Scheduling at [phone] right away to schedule the test.  Once scheduled, the [facility] Centralized Referral Team will work with your insurance carrier to obtain pre-certification or prior authorization.  Depending on your insurance carrier, approval may take 3-10 days.  It is highly recommended patients assure they have received an authorization before having a test performed.  If test is done without insurance authorization, patient may be responsible for the entire amount billed.      Precertification and Prior Authorizations:  If your physician has recommended that you have a procedure or additional testing performed the [facility]  Centralized Referral Team will contact your insurance carrier to obtain pre-certification or prior authorization.    You are strongly encouraged to contact your insurance carrier to verify that your procedure/test has been approved and is a COVERED benefit.  Although the [facility] Centralized Referral Team does its due diligence, the insurance carrier gives the disclaimer that \"Although the procedure is authorized, this does not guarantee payment.\"    Ultimately the patient is responsible for payment.   Thank you for your understanding in this matter.  Paperwork Completion:  If you require FMLA or disability paperwork for your recovery, please make sure to either drop it off or have it faxed to our office at [phone]. Be sure the form has your name and date of birth on it.  The form will be faxed to our Forms Department and they will complete it for you.  There is a 25$ fee for all forms that need to be filled out.  Please be aware there is a 10-14 day turnaround time.  You will need to sign a release of information (BELTRAN) form if your paperwork does not come with one.  You may call the Forms Department with any questions at [phone].  Their fax number is [phone].

## 2024-06-18 ENCOUNTER — TELEPHONE (OUTPATIENT)
Dept: NEUROLOGY | Facility: CLINIC | Age: 44
End: 2024-06-18

## 2024-06-18 NOTE — TELEPHONE ENCOUNTER
PT therapy evaluation received for provider review and signature. Evaluation completed on 6/48/2024.    Paperwork signed and faxed back with receipt confirmation.    No need to send to scan, evaluation is visible through care everywhere.

## 2024-07-26 RX ORDER — BACLOFEN 10 MG/1
5 TABLET ORAL 2 TIMES DAILY PRN
Qty: 180 TABLET | Refills: 1 | Status: SHIPPED | OUTPATIENT
Start: 2024-07-26

## 2024-07-26 NOTE — TELEPHONE ENCOUNTER
Medication: Baclofen     Date of last refill: 5/3/24 (#30/0)  [place]24     Last office visit: 6/14/24  Due back to clinic per last office note:  n/a  Date next office visit scheduled:    Future Appointments   Date Time Provider Department Center   12/9/2024 11:00 AM Jamie Walsh MD [facility]           Last OV note recommendation:    Problem/s Identified this visit:   1. Multiple sclerosis (HCC)    2. Left foot drop             Discussion plus Diagnostics & Treatment Orders:  Referral for WALKAIDE for his left foot drop        (x) Discussed potential side effects of any treatment relevant to above.  Includes explanation of tests as necessary.     No follow-ups on file.        Patient understands that if needed, based on condition and or test results, follow up will be readjusted        Jamie Walsh MD

## 2024-07-30 ENCOUNTER — TELEPHONE (OUTPATIENT)
Dept: NEUROLOGY | Facility: CLINIC | Age: 44
End: 2024-07-30

## 2024-07-31 ENCOUNTER — MED REC SCAN ONLY (OUTPATIENT)
Dept: NEUROLOGY | Facility: CLINIC | Age: 44
End: 2024-07-31

## 2024-07-31 NOTE — TELEPHONE ENCOUNTER
Form completed and signed by provider.    Faxed with receipt confirmation.    Form sent to scanning on [facility] rec scan dated 7/31/2024.

## 2024-08-01 LAB
ABSOLUTE BASOPHILS: 20 CELLS/UL (ref 0–200)
ABSOLUTE EOSINOPHILS: 51 CELLS/UL (ref 15–500)
ABSOLUTE LYMPHOCYTES: 1806 CELLS/UL (ref 850–3900)
ABSOLUTE MONOCYTES: 316 CELLS/UL (ref 200–950)
ABSOLUTE NEUTROPHILS: 1708 CELLS/UL (ref 1500–7800)
ALT: 20 U/L (ref 9–46)
APPEARANCE: CLEAR
AST: 22 U/L (ref 10–40)
BASOPHILS: 0.5 %
BILIRUBIN, DIRECT: 0.2 MG/DL
BILIRUBIN, INDIRECT: 0.6 MG/DL (CALC) (ref 0.2–1.2)
BILIRUBIN, TOTAL: 0.8 MG/DL (ref 0.2–1.2)
BILIRUBIN: NEGATIVE
COLOR: YELLOW
EOSINOPHILS: 1.3 %
GLUCOSE: NEGATIVE
HEMATOCRIT: 38.8 % (ref 38.5–50)
HEMOGLOBIN: 12.5 G/DL (ref 13.2–17.1)
KETONES: NEGATIVE
LEUKOCYTE ESTERASE: NEGATIVE
LYMPHOCYTES: 46.3 %
MCH: 28.9 PG (ref 27–33)
MCHC: 32.2 G/DL (ref 32–36)
MCV: 89.8 FL (ref 80–100)
MONOCYTES: 8.1 %
MPV: 10.6 FL (ref 7.5–12.5)
NEUTROPHILS: 43.8 %
NITRITE: NEGATIVE
PH: 6 (ref 5–8)
PLATELET COUNT: 270 THOUSAND/UL (ref 140–400)
PROTEIN: NEGATIVE
RDW: 12.1 % (ref 11–15)
RED BLOOD CELL COUNT: 4.32 MILLION/UL (ref 4.2–5.8)
SPECIFIC GRAVITY: 1.02 (ref 1–1.03)
TSH: 1.8 MIU/L (ref 0.4–4.5)
WHITE BLOOD CELL COUNT: 3.9 THOUSAND/UL (ref 3.8–10.8)

## 2024-08-05 DIAGNOSIS — G35 MULTIPLE SCLEROSIS (HCC): ICD-10-CM

## 2024-08-05 DIAGNOSIS — G04.91 MYELITIS (HCC): ICD-10-CM

## 2024-08-05 RX ORDER — TRAMADOL HYDROCHLORIDE 50 MG/1
50 TABLET ORAL 2 TIMES DAILY PRN
Qty: 60 TABLET | Refills: 5 | Status: SHIPPED | OUTPATIENT
Start: 2024-08-05

## 2024-08-05 NOTE — TELEPHONE ENCOUNTER
Medication: Tramadol 50 mg     Date of last refill: 02/12/2024 with 5 adt refills  [place]24     Last office visit: 6/14/24  Due back to clinic per last office note:  n/a  Date next office visit scheduled:           Future Appointments   Date Time Provider Department Center   12/9/2024 11:00 AM Jamie Walsh MD [facility]            Last OV note recommendation:     Problem/s Identified this visit:   1. Multiple sclerosis (HCC)    2. Left foot drop             Discussion plus Diagnostics & Treatment Orders:  Referral for WALKAIDE for his left foot drop        (x) Discussed potential side effects of any treatment relevant to above.  Includes explanation of tests as necessary.     No follow-ups on file.        Patient understands that if needed, based on condition and or test results, follow up will be readjusted        Jamie Walsh MD

## 2024-09-17 ENCOUNTER — TELEPHONE (OUTPATIENT)
Dept: NEUROLOGY | Facility: CLINIC | Age: 44
End: 2024-09-17

## 2024-09-17 ENCOUNTER — MED REC SCAN ONLY (OUTPATIENT)
Dept: NEUROLOGY | Facility: CLINIC | Age: 44
End: 2024-09-17

## 2024-09-17 NOTE — TELEPHONE ENCOUNTER
Results of Sept monthly [facility] labs received for provider review.    Labs are not interfacing in Epic.    Sent to scanning on [facility] rec scan dated: 9/17/2024.

## 2024-10-04 NOTE — TELEPHONE ENCOUNTER
[facility] infusion record received for Round 1 day #4 infusion record on 8/11/2022    Patient reports some chest redness this morning along with difficulty sleeping over night and increased fatigue and weakness. Patient infused over 4 hours without reactions. Patient stable during the 2 hour post infusion observation period. Patient premedicated with:  Acetaminophen 1000 mg orally once  Benadryl 50 mg orally once  Solumedrol 1000 mg IVPB once over 1 hour    Patient scheduled for final infusion on 8/12/2022. Writer spoke to patient. Four days ago he was in urgent care where he was diagnosed with COPD and was prescribed prednisone and doxycycline. Since starting the medications, he has felt dizzy and weak. When he is sitting or laying down he is ok, but once he stands up he feels dizzy like he could fall. Two days ago he developed shortness of breath with activity. He quickly stopped the prednisone before finishing the course but that has not helped his symptoms to improve. He did recently have three days of illness/diarrhea but that has since passed. He is eating normally but admits he probably isn't drinking as much water as he should be.     He denies chest pain, palpitations, or edema.     Blood pressure today is 92/51, heart rate 88. He had not been checking blood pressures at home prior to today.     Weights:  10/4 204 lbs  10/2 200  9/29 198    He is taking his cardiac medications as shown in EPIC with the following exceptions:  He takes eliquis 5 mg twice daily instead of the prescribed 2.5 mg twice daily.  He is taking hydralazine 50 mg three times daily which is not found on medication list.   He does not take metoprolol, which is listed on medication list.     SURJIT Gann 9/5/24  TRISTIAN Gann 3/13/25    Writer will send message to NP for review and will call patient with recommendations. Advised for patient to present to emergency room for evaluation with any new or worsening symptoms in the meantime. Patient verbalized understanding.

## 2024-10-31 ENCOUNTER — TELEPHONE (OUTPATIENT)
Dept: NEUROLOGY | Facility: CLINIC | Age: 44
End: 2024-10-31

## 2024-10-31 NOTE — TELEPHONE ENCOUNTER
October [facility] labs received from [employer] collected on 10/30/24.  On provider's desk for review.    Labs not viewable in Epic.    Sent copy to scan.

## 2024-12-04 ENCOUNTER — TELEPHONE (OUTPATIENT)
Dept: NEUROLOGY | Facility: CLINIC | Age: 44
End: 2024-12-04

## 2024-12-04 NOTE — TELEPHONE ENCOUNTER
Monthly [facility] labs for December received.     Labs not uploading to [facility].     Lab results sent to scanning. See Med-rec dated 12/4/24.

## 2024-12-09 ENCOUNTER — OFFICE VISIT (OUTPATIENT)
Dept: NEUROLOGY | Facility: CLINIC | Age: 44
End: 2024-12-09
Payer: COMMERCIAL

## 2024-12-09 VITALS
HEIGHT: 73 IN | RESPIRATION RATE: 16 BRPM | HEART RATE: 106 BPM | SYSTOLIC BLOOD PRESSURE: 122 MMHG | WEIGHT: 165 LBS | BODY MASS INDEX: 21.87 KG/M2 | DIASTOLIC BLOOD PRESSURE: 68 MMHG

## 2024-12-09 DIAGNOSIS — G35 MULTIPLE SCLEROSIS (HCC): Primary | ICD-10-CM

## 2024-12-09 DIAGNOSIS — G04.91 MYELITIS (HCC): ICD-10-CM

## 2024-12-09 NOTE — PATIENT INSTRUCTIONS
Refill policies:    Allow 2-3 business days for refills; controlled substances may take longer.  Contact your pharmacy at least 5 days prior to running out of medication and have them send an electronic request or submit request through the “request refill” option in your [employer] account.  Refills are not addressed on weekends; covering physicians do not authorize routine medications on weekends.  No narcotics or controlled substances are refilled after noon on Fridays or by on call physicians.  By law, narcotics must be electronically prescribed.  A 30 day supply with no refills is the maximum allowed.  If your prescription is due for a refill, you may be due for a follow up appointment.  To best provide you care, patients receiving routine medications need to be seen at least once a year.  Patients receiving narcotic/controlled substance medications need to be seen at least once every 3 months.  In the event that your preferred pharmacy does not have the requested medication in stock (e.g. Backordered), it is your responsibility to find another pharmacy that has the requested medication available.  We will gladly send a new prescription to that pharmacy at your request.    Scheduling Tests:    If your physician has ordered radiology tests such as MRI or CT scans, please contact Central Scheduling at [phone] right away to schedule the test.  Once scheduled, the [facility] Centralized Referral Team will work with your insurance carrier to obtain pre-certification or prior authorization.  Depending on your insurance carrier, approval may take 3-10 days.  It is highly recommended patients assure they have received an authorization before having a test performed.  If test is done without insurance authorization, patient may be responsible for the entire amount billed.      Precertification and Prior Authorizations:  If your physician has recommended that you have a procedure or additional testing performed the [facility]  Centralized Referral Team will contact your insurance carrier to obtain pre-certification or prior authorization.    You are strongly encouraged to contact your insurance carrier to verify that your procedure/test has been approved and is a COVERED benefit.  Although the [facility] Centralized Referral Team does its due diligence, the insurance carrier gives the disclaimer that \"Although the procedure is authorized, this does not guarantee payment.\"    Ultimately the patient is responsible for payment.   Thank you for your understanding in this matter.  Paperwork Completion:  If you require FMLA or disability paperwork for your recovery, please make sure to either drop it off or have it faxed to our office at [phone]. Be sure the form has your name and date of birth on it.  The form will be faxed to our Forms Department and they will complete it for you.  There is a 25$ fee for all forms that need to be filled out.  Please be aware there is a 10-14 day turnaround time.  You will need to sign a release of information (BELTRAN) form if your paperwork does not come with one.  You may call the Forms Department with any questions at [phone].  Their fax number is [phone].

## 2024-12-09 NOTE — PROGRESS NOTES
NEUROLOGY  [facility]       Ivan ReyesAmritOrlin  11/3/1980  Primary Care Provider:  EJ PULLIAM    12/9/2024  44 year old yo,  was last seen on:: June 2024    Seen for/plans last visit:  MS  Was on Kesimpta then LEMTRADA  Lemtrada Round 1: 8/8/2022 - 8/12/2022    Lemtrada Round 2: 8/21/2023 - 8/23/2023      Previous visit and existing record notes reviewed in preparation for the face to face visit.  Relevant labs and studies reviewed and will be noted in relevant areas of this record.  Accompanied visit:     (x) No.      Present condition:  No better or worse  Walking is same  Unstable with weakness in the left leg    Bladder control is better  Hair is growing  Takes energy drink and helped with fatigue    Past History update/new problem(s): no new medical issues    Review of Systems:  Review of Systems:  Denies systemic symptoms     No CP or SOB.  No GI or  symptoms. Relevant Neuro as noted above.      Medications:      Current Outpatient Medications:     TRAMADOL 50 MG Oral Tab, TAKE 1 TABLET(50 MG) BY MOUTH TWICE DAILY AS NEEDED, Disp: 60 tablet, Rfl: 5    BACLOFEN 10 MG Oral Tab, TAKE ONE-HALF TABLET BY MOUTH TWICE DAILY AS NEEDED, Disp: 180 tablet, Rfl: 1    zolpidem 10 MG Oral Tab, Take 1 tablet (10 mg total) by mouth nightly as needed., Disp: 30 tablet, Rfl: 0    famotidine (PEPCID) 20 MG Oral Tab, Take twice daily for 14 days starting day prior to infusions., Disp: 28 tablet, Rfl: 0    predniSONE 10 MG Oral Tab, Take 50 mg night before infusions., Disp: 5 tablet, Rfl: 0    tiZANidine 4 MG Oral Tab, Take 1-2 tablets (4-8 mg total) by mouth 2 (two) times daily as needed., Disp: , Rfl:     nabumetone 750 MG Oral Tab, Take 1 tablet (750 mg total) by mouth 2 (two) times daily as needed for Pain., Disp: 60 tablet, Rfl: 5    oxybutynin ER 10 MG Oral Tablet 24 Hr, Take 1 tablet (10 mg total) by mouth daily., Disp: 30 tablet, Rfl: 5    alemtuzumab 12 mg/1.2mL Intravenous Solution, Inject into the  vein., Disp: , Rfl:     Multiple Vitamin (ONE-DAILY MULTI VITAMINS) Oral Tab, Take 1 tablet by mouth daily., Disp: , Rfl:   PRN:     Allergies:  Allergies[1]       EXAM:  /68 (BP Location: Left arm, Patient Position: Sitting, Cuff Size: adult)   Pulse 106   Resp 16   Ht 73\"   Wt 165 lb (74.8 kg)   BMI 21.77 kg/m²   Looks stated age  General Exam:  HENT:  pink conjunctiva anicteric sclerae  Neck no adenopathy, thyroid normal  Heart and Lungs:  normal  Extremities: no cyanosis, skin changes    NEURO  Alert oriented fluent speech  CN grossly normal  No arm drift   FFM intact FTN normal no sensory loss uppers  Lowers  numb subjective on the left side where he has the foot drop   left leg tone is increased as well    Romberg's positive  Walks with cane and left leg tends to cross midline        INTERPRETATION of RELEVANT LABS and other DATA:    Labs in December reviewed        Problem/s Identified this visit:   1. Multiple sclerosis (HCC): supra and infratentorial    2. Myelitis:  entire cord, nodular pattern          Discussion plus Diagnostics & Treatment Orders:  Follow up BRAIN and CORD mri  Continue post Lemtrada LAB surveillance        (x) Discussed potential side effects of any treatment relevant to above.  Includes explanation of tests as necessary.    Return in about 6 months (around 6/9/2025).      Patient understands that if needed, based on condition and or test results, follow up will be readjusted      Jamie Walsh MD  Vascular & General Neurology  Director, Multiple Sclerosis Program  [facility]  12/9/2024, Time completed 11:23 AM    Decision making:  ( x ) labs reviewed/ordered - 1  (  ) new diagnosis: - 1  ( x) Images & studies independently reviewed -non F2F  (  ) Case/studies discussed with other caregivers - -non F2F  (  ) Telephone time with patiern or authorized [facility] member--non F2F  ( x ) other records reviewed --non F2F including consultations  (  ) [facility] meetings -  patient not present --non F2F  (  ) Independent Historian obtained    Non Face to Face CPT code 67470/98178 applies as documented above    PROCEDURE DONE     (   ) see notes        After visit, patient was escorted out and handed-off discharge process and instructions to the check out desk.  No additional issues relevant to visit were raised to staff at this time interval.        This document is to be interpreted as my current opinion regarding the case as of the stated date of service based on the information available to me at this time and may supersedes any prior opinion expressed either orally or in writing.  Services rendered are only within the scope of direct medical care  Sometimes, reports may have been prepared partially using a speech recognition software technology.  If a word or phrase is confusing or out of context, please do not hesitate to call for clarification.                [1] No Known Allergies

## 2025-01-28 ENCOUNTER — TELEPHONE (OUTPATIENT)
Dept: NEUROLOGY | Facility: CLINIC | Age: 45
End: 2025-01-28

## 2025-01-28 ENCOUNTER — MED REC SCAN ONLY (OUTPATIENT)
Dept: NEUROLOGY | Facility: CLINIC | Age: 45
End: 2025-01-28

## 2025-01-28 NOTE — TELEPHONE ENCOUNTER
January monthly Lemtrada results received for provider review. Results are not interfacing in Epic.   Sent to scanning on [employer] scan dated: 1/28/2025    All labs within normal limits.    Except: trace occult blood in urine, moderate calcium oxalate crystals

## 2025-02-03 ENCOUNTER — PATIENT MESSAGE (OUTPATIENT)
Dept: NEUROLOGY | Facility: CLINIC | Age: 45
End: 2025-02-03

## 2025-02-03 DIAGNOSIS — G04.91 MYELITIS (HCC): ICD-10-CM

## 2025-02-03 DIAGNOSIS — G35 MULTIPLE SCLEROSIS (HCC): ICD-10-CM

## 2025-02-03 RX ORDER — TRAMADOL HYDROCHLORIDE 50 MG/1
50 TABLET ORAL 2 TIMES DAILY PRN
Qty: 60 TABLET | Refills: 5 | Status: SHIPPED | OUTPATIENT
Start: 2025-02-03

## 2025-02-03 NOTE — TELEPHONE ENCOUNTER
Medication: Tramadol 50 mg     Date of last refill: 8/05/2024 with 5 addt refills  [place]      Last office visit: 12/29/2024  Due back to clinic per last office note:    Date next office visit scheduled:    Future Appointments   Date Time Provider Department Center   6/9/2025 11:20 AM Jamie Walsh MD [facility]           Last OV note recommendation:    Discussion plus Diagnostics & Treatment Orders:  Follow up BRAIN and CORD mri  Continue post Lemtrada LAB surveillance           (x) Discussed potential side effects of any treatment relevant to above.  Includes explanation of tests as necessary.     Return in about 6 months (around 6/9/2025).

## 2025-04-01 ENCOUNTER — TELEPHONE (OUTPATIENT)
Dept: NEUROLOGY | Facility: CLINIC | Age: 45
End: 2025-04-01

## 2025-04-01 ENCOUNTER — MED REC SCAN ONLY (OUTPATIENT)
Dept: NEUROLOGY | Facility: CLINIC | Age: 45
End: 2025-04-01

## 2025-04-01 NOTE — TELEPHONE ENCOUNTER
March monthly Lemtrada results received for provider review. Results are not interfacing in Epic.   Sent to scanning on [employer] scan dated: 4/1/2025    All labs within normal limits except:    Hemoglobin = 12.6 (L)

## 2025-05-01 ENCOUNTER — TELEPHONE (OUTPATIENT)
Dept: NEUROLOGY | Facility: CLINIC | Age: 45
End: 2025-05-01

## 2025-05-01 NOTE — TELEPHONE ENCOUNTER
March monthly Lemtrada results received for provider review. Results are not interfacing in Epic.   Sent to scanning on [employer] scan dated: 4/1/2025     All labs within normal limits except:     Hemoglobin = 12.6 (L)  MCHC = 31.6  RBC = 3-10  Bacteria = Few

## 2025-06-09 ENCOUNTER — OFFICE VISIT (OUTPATIENT)
Dept: NEUROLOGY | Facility: CLINIC | Age: 45
End: 2025-06-09
Payer: MEDICAID

## 2025-06-09 VITALS
DIASTOLIC BLOOD PRESSURE: 70 MMHG | HEART RATE: 66 BPM | SYSTOLIC BLOOD PRESSURE: 120 MMHG | BODY MASS INDEX: 22 KG/M2 | HEIGHT: 73 IN | TEMPERATURE: 98 F | RESPIRATION RATE: 14 BRPM | OXYGEN SATURATION: 98 %

## 2025-06-09 DIAGNOSIS — R29.898 WEAKNESS OF RIGHT LEG: ICD-10-CM

## 2025-06-09 DIAGNOSIS — G04.91 MYELITIS (HCC): ICD-10-CM

## 2025-06-09 DIAGNOSIS — G35 MULTIPLE SCLEROSIS (HCC): Primary | ICD-10-CM

## 2025-06-09 PROCEDURE — 99214 OFFICE O/P EST MOD 30 MIN: CPT | Performed by: OTHER

## 2025-06-09 NOTE — PATIENT INSTRUCTIONS
Refill policies:    Allow 2-3 business days for refills; controlled substances may take longer.  Contact your pharmacy at least 5 days prior to running out of medication and have them send an electronic request or submit request through the “request refill” option in your [employer] account.  Refills are not addressed on weekends; covering physicians do not authorize routine medications on weekends.  No narcotics or controlled substances are refilled after noon on Fridays or by on call physicians.  By law, narcotics must be electronically prescribed.  A 30 day supply with no refills is the maximum allowed.  If your prescription is due for a refill, you may be due for a follow up appointment.  To best provide you care, patients receiving routine medications need to be seen at least once a year.  Patients receiving narcotic/controlled substance medications need to be seen at least once every 3 months.  In the event that your preferred pharmacy does not have the requested medication in stock (e.g. Backordered), it is your responsibility to find another pharmacy that has the requested medication available.  We will gladly send a new prescription to that pharmacy at your request.    Scheduling Tests:    If your physician has ordered radiology tests such as MRI or CT scans, please contact Central Scheduling at [phone] right away to schedule the test.  Once scheduled, the [facility] Centralized Referral Team will work with your insurance carrier to obtain pre-certification or prior authorization.  Depending on your insurance carrier, approval may take 3-10 days.  It is highly recommended patients assure they have received an authorization before having a test performed.  If test is done without insurance authorization, patient may be responsible for the entire amount billed.      Precertification and Prior Authorizations:  If your physician has recommended that you have a procedure or additional testing performed the [facility]  Centralized Referral Team will contact your insurance carrier to obtain pre-certification or prior authorization.    You are strongly encouraged to contact your insurance carrier to verify that your procedure/test has been approved and is a COVERED benefit.  Although the [facility] Centralized Referral Team does its due diligence, the insurance carrier gives the disclaimer that \"Although the procedure is authorized, this does not guarantee payment.\"    Ultimately the patient is responsible for payment.   Thank you for your understanding in this matter.  Paperwork Completion:  If you require FMLA or disability paperwork for your recovery, please make sure to either drop it off or have it faxed to our office at [phone]. Be sure the form has your name and date of birth on it.  The form will be faxed to our Forms Department and they will complete it for you.  There is a 25$ fee for all forms that need to be filled out.  Please be aware there is a 10-14 day turnaround time.  You will need to sign a release of information (BELTRAN) form if your paperwork does not come with one.  You may call the Forms Department with any questions at [phone].  Their fax number is [phone].

## 2025-06-09 NOTE — PROGRESS NOTES
NEUROLOGY  [facility]       Ivan Reyes-Oliver  11/3/1980  Primary Care Provider:  EJ PULLIAM    6/9/2025  44 year old yo,     Assessment & plans last visit:  December 2024 and was thought to be stable    Previous visit and existing record notes reviewed in preparation for the face to face visit.  Relevant labs and studies reviewed and will be noted in relevant areas of this record.  Accompanied visit:     (x) No.      Present condition:  When it is humid and hot, he gets weaker in his legs and   He feels there is pain in the left lumbar as well    He also plains that because he got into bed with bad position  When walking he feels he tends to fall to the left    Vision harder to read smaller prints      Past History Reviewed & update/new problem(s): as above    Review of Systems:  Review of Systems:  Denies systemic symptoms     No CP or SOB.  No GI or  symptoms. Relevant Neuro as noted above.      Medications:    Medications - Current[1]  PRN: PRN Medications[2]    Allergies:  Allergies[3]       EXAM:  /70   Pulse 66   Temp 98 °F (36.7 °C) (Temporal)   Resp 14   Ht 73\"   SpO2 98%   BMI 21.77 kg/m²   Looks stated age  General Exam:  HENT:  pink conjunctiva anicteric sclerae  Neck no adenopathy, thyroid normal  Heart and Lungs:  normal  Extremities: no cyanosis, skin changes    NEURO  Alert oriented  CN grossly normal  Uppers no drift  Right leg is -3/5 proximally  There are errors with position sense right leg  Babinski right    DTRS hyporeflexic knee and absent ankles bilaterally  Gait uses CANE        INTERPRETATION of RELEVANT LABS and other DATA:    Jan 2025 BRAIN  IMPRESSION:   1. Multiple T2/FLAIR hyperintense signal abnormalities in the supratentorial white matter bilaterally and additional T2/FLAIR hyperintense signal abnormalities in the genu of the corpus callosum, right basal ganglia, left thalamus, kaitlin, and left   cerebellar hemisphere are unchanged compared to the  1/2024 MRI and remain compatible with demyelinating lesions of multiple sclerosis. No lesion enhancement is identified on postcontrast imaging.     2. No new or acute intracranial abnormalities are identified.     CERVICAL  IMPRESSION:   1. Redemonstrated T2/STIR hyperintense spinal cord lesions are not significantly changed from 12/14/2023. No new spinal cord lesions with no abnormal enhancing lesions. Mild spinal cord volume loss is again noted at the C2 vertebral level.   2. Resolution of the previously seen small central disc protrusion at C6-C7.   3. Mild cervical spondylosis is otherwise unchanged from 2023 with no new evidence of disc herniation. No significant spinal stenosis or neural foraminal narrowing throughout cervical spine. Cervical alignment remains normal.     IMPRESSION:   1. Multifocal ill-defined T2/STIR hyperintense signal abnormalities throughout the thoracic spinal cord have not appreciably changed compared to the prior MRI and remain compatible with demyelinating lesions of multiple sclerosis. No lesion enhancement   is identified on postcontrast imaging. Mild thoracic spinal cord volume loss at the T4-T5 level.     2. Minimal upper thoracic dextro curvature. Small chronic Schmorl's nodes in the vertebral body endplates at the T6-T7 and T7-T8 level.     3. Mild disc desiccation and minimal to mild disc space narrowing at T4-T5 to T7-T8, although the remaining thoracic discs are normal in height and signal. No thoracic disc herniations or disc bulges, and no thoracic spinal or neural foraminal stenosis..           Problem/s Identified this visit:   1. Multiple sclerosis (HCC): supra and infratentorial    2. Myelitis:  entire cord, nodular pattern    3. Weakness of right leg          Discussion plus Diagnostics & Treatment Orders:  He does not want STEROID YET.    Get biomarkers      Procedures    NEUROFILAMENT LIGHT CHAIN, SERUM ([facility])    Complement C4, Serum    Immunoglobulin free LT  chains blood           (x) Discussed potential side effects of any treatment relevant to above.  Includes explanation of tests as necessary.    Return in about 6 months (around 12/9/2025).      Patient understands that if needed, based on condition and or test results, follow up will be readjusted      Jamie Walsh MD  Vascular & General Neurology  Director, Multiple Sclerosis Program  [facility]  6/9/2025, Time completed 11:30 AM    Decision making:  ( x ) labs reviewed/ordered - 1  (  ) new diagnosis: - 1  ( x) Images & studies independently reviewed -non F2F  (  ) Case/studies discussed with other caregivers - -non F2F  (  ) Telephone time with patiern or authorized Fam member--non F2F  ( x ) other records reviewed --non F2F including consultations  (  ) [facility] meetings - patient not present --non F2F  (  ) Independent Historian obtained    Non Face to Face CPT code 96704/64238 applies as documented above    PROCEDURE DONE     (   ) see notes        After visit, patient was escorted out and handed-off discharge process and instructions to the check out desk.  No additional issues relevant to visit were raised to staff at this time interval.        This document is to be interpreted as my current opinion regarding the case as of the stated date of service based on the information available to me at this time and may supersedes any prior opinion expressed either orally or in writing.  Services rendered are only within the scope of direct medical care  Sometimes, reports may have been prepared partially using a speech recognition software technology.  If a word or phrase is confusing or out of context, please do not hesitate to call for clarification.              [1]   Current Outpatient Medications:     TRAMADOL 50 MG Oral Tab, TAKE 1 TABLET BY MOUTH TWICE DAILY AS NEEDED, Disp: 60 tablet, Rfl: 5    BACLOFEN 10 MG Oral Tab, TAKE ONE-HALF TABLET BY MOUTH TWICE DAILY AS NEEDED, Disp: 180 tablet,  Rfl: 1    zolpidem 10 MG Oral Tab, Take 1 tablet (10 mg total) by mouth nightly as needed., Disp: 30 tablet, Rfl: 0    famotidine (PEPCID) 20 MG Oral Tab, Take twice daily for 14 days starting day prior to infusions., Disp: 28 tablet, Rfl: 0    tiZANidine 4 MG Oral Tab, Take 1-2 tablets (4-8 mg total) by mouth 2 (two) times daily as needed., Disp: , Rfl:     nabumetone 750 MG Oral Tab, Take 1 tablet (750 mg total) by mouth 2 (two) times daily as needed for Pain., Disp: 60 tablet, Rfl: 5    oxybutynin ER 10 MG Oral Tablet 24 Hr, Take 1 tablet (10 mg total) by mouth daily. (Patient taking differently: Take 1 tablet (10 mg total) by mouth as needed.), Disp: 30 tablet, Rfl: 5    Multiple Vitamin (ONE-DAILY MULTI VITAMINS) Oral Tab, Take 1 tablet by mouth daily., Disp: , Rfl:     predniSONE 10 MG Oral Tab, Take 50 mg night before infusions. (Patient not taking: Reported on 6/9/2025), Disp: 5 tablet, Rfl: 0    alemtuzumab 12 mg/1.2mL Intravenous Solution, Inject into the vein. (Patient not taking: Reported on 6/9/2025), Disp: , Rfl:   [2] [3] No Known Allergies

## 2025-06-12 ENCOUNTER — TELEPHONE (OUTPATIENT)
Dept: NEUROLOGY | Facility: CLINIC | Age: 45
End: 2025-06-12

## 2025-06-12 NOTE — TELEPHONE ENCOUNTER
Labs received via fax from [facility], not visible in EPIC;    CREATININE  BILIRUBIN  ALT  AST  CBC  UA    Placed on provider's desk.  Copy to scan.

## 2025-07-25 DIAGNOSIS — G04.91 MYELITIS (HCC): ICD-10-CM

## 2025-07-25 DIAGNOSIS — G35 MULTIPLE SCLEROSIS (HCC): ICD-10-CM

## 2025-07-25 RX ORDER — TRAMADOL HYDROCHLORIDE 50 MG/1
50 TABLET ORAL 2 TIMES DAILY PRN
Qty: 60 TABLET | Refills: 5 | Status: CANCELLED | OUTPATIENT
Start: 2025-07-25

## 2025-07-28 RX ORDER — BACLOFEN 10 MG/1
5 TABLET ORAL 2 TIMES DAILY PRN
Qty: 180 TABLET | Refills: 1 | Status: SHIPPED | OUTPATIENT
Start: 2025-07-28

## 2025-07-28 RX ORDER — TRAMADOL HYDROCHLORIDE 50 MG/1
50 TABLET ORAL 2 TIMES DAILY PRN
Qty: 60 TABLET | Refills: 1 | Status: SHIPPED | OUTPATIENT
Start: 2025-07-28

## 2025-07-28 NOTE — TELEPHONE ENCOUNTER
Medication: TRAMADOL 50MG TABLETS      Date of last refill: 02/ 03/2025(#60/5)  [place]     Last office visit: 06/09/2025  Due back to clinic per last office note:  12/09/2025  Date next office visit scheduled:    Future Appointments   Date Time Provider Department Center   12/9/2025 11:40 AM Jamie Walsh MD [facility]           Last OV note recommendation:    1. Multifocal ill-defined T2/STIR hyperintense signal abnormalities throughout the thoracic spinal cord have not appreciably changed compared to the prior MRI and remain compatible with demyelinating lesions of multiple sclerosis. No lesion enhancement   is identified on postcontrast imaging. Mild thoracic spinal cord volume loss at the T4-T5 level.     2. Minimal upper thoracic dextro curvature. Small chronic Schmorl's nodes in the vertebral body endplates at the T6-T7 and T7-T8 level.     3. Mild disc desiccation and minimal to mild disc space narrowing at T4-T5 to T7-T8, although the remaining thoracic discs are normal in height and signal. No thoracic disc herniations or disc bulges, and no thoracic spinal or neural foraminal stenosis..               Problem/s Identified this visit:   1. Multiple sclerosis (HCC): supra and infratentorial    2. Myelitis:  entire cord, nodular pattern    3. Weakness of right leg             Discussion plus Diagnostics & Treatment Orders:  He does not want STEROID YET.    Get biomarkers          Procedures    NEUROFILAMENT LIGHT CHAIN, SERUM ([facility])    Complement C4, Serum    Immunoglobulin free LT chains blood

## 2025-07-28 NOTE — TELEPHONE ENCOUNTER
Medication: baclofen 10 MG Oral Tab      Date of last refill: 07/26/2024 (#180/1)  [place]      Last office visit: 06/09/2025  Due back to clinic per last office note:  12/9/25  Date next office visit scheduled:    Future Appointments   Date Time Provider Department Center   12[facility]2025 11:40 [facility]           Last [facility] note recommendation:    1. Multifocal ill-defined T2/STIR hyperintense signal abnormalities throughout the thoracic spinal cord have not appreciably changed compared to the prior MRI and remain compatible with demyelinating lesions of multiple sclerosis. No lesion enhancement   is identified on postcontrast imaging. Mild thoracic spinal cord volume loss at the T4-T5 level.     2. Minimal upper thoracic dextro curvature. Small chronic Schmorl's nodes in the vertebral body endplates at the T6-T7 and T7-T8 level.     3. Mild disc desiccation and minimal to mild disc space narrowing at T4-T5 to T7-T8, although the remaining thoracic discs are normal in height and signal. No thoracic disc herniations or disc bulges, and no thoracic spinal or neural foraminal stenosis..               Problem/s Identified this visit:   1. Multiple sclerosis (HCC): supra and infratentorial    2. Myelitis:  entire cord, nodular pattern    3. Weakness of right leg             Discussion plus Diagnostics & Treatment Orders:  He does not want STEROID YET.    Get biomarkers          Procedures    NEUROFILAMENT LIGHT CHAIN, SERUM ([facility])    Complement C4, Serum    Immunoglobulin free LT chains blood

## 2025-08-19 ENCOUNTER — TELEPHONE (OUTPATIENT)
Dept: NEUROLOGY | Facility: CLINIC | Age: 45
End: 2025-08-19

## (undated) DIAGNOSIS — G35 MULTIPLE SCLEROSIS (HCC): Primary | ICD-10-CM

## (undated) DIAGNOSIS — G35 MS (MULTIPLE SCLEROSIS) (HCC): Primary | ICD-10-CM

## (undated) DIAGNOSIS — G35 MULTIPLE SCLEROSIS (HCC): ICD-10-CM

## (undated) NOTE — MR AVS SNAPSHOT
[facility] [address] [facility] [address] [phone]               Thank you for choosing us for your health care visit with ANIKA Hampton.   We are glad to serve you and happy to provide you with this summary of your v Take 1 tablet (10 mg total) by mouth nightly as needed for Sleep.    Commonly known as:  AMBIEN                   Medications Administered in the Office Today     MethylPREDNISolone Sodium Succ (Solu-MEDROL) 500 MG injection 500 mg                    [employer]
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
## (undated) NOTE — MR AVS SNAPSHOT
[address]  [address] [address] [phone]               Thank you for choosing us for your health care visit with Link Thayer DO.   We are glad to serve you and happy to provide you with this coombs Generic drug:  Glatiramer Acetate   Inject 40 mg into the skin 3 (three) times a week. ONE-DAILY MULTI VITAMINS Tabs   Take 1 tablet by mouth daily.            TraMADol HCl 50 MG Tabs   Take 1 tablet (50 mg total) by mouth every 6 (six) hours as n

## (undated) NOTE — MR AVS SNAPSHOT
ANIKA MartinezAlexander[address] [facility] [address] [phone]               Thank you for choosing us for your health care visit with ANIKA Barcenas.   We are glad to serve you and happy to provide you with this summary of your v Take 1 tablet (10 mg total) by mouth nightly as needed for Sleep.    Commonly known as:  AMBIEN                   Medications Administered in the Office Today     MethylPREDNISolone Sodium Succ (Solu-MEDROL) 500 MG injection 500 mg                    [employer]

## (undated) NOTE — LETTER
2020      Regarding: MILES Chen 11/3/1980        To Whom it May Concern:     This patient is followed by my office for treatment and management of Multiple Sclerosis.  He is stable on his current medication regimen and is cleared from a

## (undated) NOTE — LETTER
8/15/2017        Regarding: MILES Barboas 11/3/1980      To Whom it May Concern: This patient is followed by my office for treatment and management of Multiple Sclerosis.  He is stable on his current medication regimen and is cleared f

## (undated) NOTE — MR AVS SNAPSHOT
[address]  [address] [facility], [address] [address] [phone]               Thank you for choosing us for your health care visit with Diamond Hurley DO.   We are glad to serve you and happy to provide you with this coombs authorization numbers or be assured that none are required. You can then schedule your appointment. Failure to obtain required authorization numbers can create reimbursement difficulties for you.     Assoc Dx:  Multiple sclerosis ([facility].) Hannah Rueda          Reason

## (undated) NOTE — LETTER
Date: 6/25/2020    Patient Name: Judy Colvin          To Whom it may concern: This letter has been written at the patient's request. The above patient was seen at the [facility] for treatment of a medical condition.     This patient s

## (undated) NOTE — Clinical Note
Date: 4/22/2017    Patient Name: Noemi Wayne          To Whom it may concern: The above patient was seen at the [facility] for treatment of a medical condition. This patient should be excused from attending work from 4/22/2017.

## (undated) NOTE — Clinical Note
3/8/2017    Rheta Robert Lee   11/3/1980      To Whom it may concern,      This is to kindly inform you that Mr Arvin Angeles is under my care for the diagnosis of     Multiple Sclerosis.  Mr Arvin Angeles is on Copaxone, 40 mg subcutaneous injections     three rema

## (undated) NOTE — MR AVS SNAPSHOT
[facility] [address] 1212 [facility] [address] [phone]               Thank you for choosing us for your health care visit with ANIKA Stewart.   We are glad to serve you and happy to provide you with this summary of your v Call the [employer] for assistance with your inactive [employer] account    If you have questions, you can call [phone] to talk to our [facility] Staff. Remember, [employer] is NOT to be used for urgent needs. For medical emergencies, dial 911.     Vi

## (undated) NOTE — MR AVS SNAPSHOT
[facility] [address] 1212 [facility] [address] [phone]               Thank you for choosing us for your health care visit with Ellouise Sandhoff, MD.  We are glad to serve you and happy to provide you with this summary of your v PRESCRIPTIONS. ? Written prescriptions must be picked up in office. ? Please allow the office 48-72 hours to fill the prescription. ? Patient must present photo ID at time of .       Scheduling Tests    If your physician has ordered radiology sonja Inject 40 mg into the skin 3 (three) times a week. ONE-DAILY MULTI VITAMINS Tabs   Take 1 tablet by mouth daily. TraMADol HCl 50 MG Tabs   Take 1 tablet (50 mg total) by mouth every 6 (six) hours as needed for Pain.    Commonly known as: